# Patient Record
Sex: MALE | Race: ASIAN | Employment: FULL TIME | URBAN - METROPOLITAN AREA
[De-identification: names, ages, dates, MRNs, and addresses within clinical notes are randomized per-mention and may not be internally consistent; named-entity substitution may affect disease eponyms.]

---

## 2024-08-22 ENCOUNTER — OFFICE VISIT (OUTPATIENT)
Age: 44
End: 2024-08-22
Payer: COMMERCIAL

## 2024-08-22 VITALS
HEART RATE: 69 BPM | RESPIRATION RATE: 18 BRPM | WEIGHT: 159.8 LBS | BODY MASS INDEX: 26.62 KG/M2 | OXYGEN SATURATION: 98 % | TEMPERATURE: 98 F | DIASTOLIC BLOOD PRESSURE: 70 MMHG | HEIGHT: 65 IN | SYSTOLIC BLOOD PRESSURE: 126 MMHG

## 2024-08-22 DIAGNOSIS — Z13.6 SCREENING FOR CARDIOVASCULAR CONDITION: ICD-10-CM

## 2024-08-22 DIAGNOSIS — Z11.4 SCREENING FOR HIV (HUMAN IMMUNODEFICIENCY VIRUS): ICD-10-CM

## 2024-08-22 DIAGNOSIS — B35.6 TINEA OF GROIN: ICD-10-CM

## 2024-08-22 DIAGNOSIS — Z11.59 NEED FOR HEPATITIS C SCREENING TEST: ICD-10-CM

## 2024-08-22 DIAGNOSIS — Z11.3 SCREENING FOR STD (SEXUALLY TRANSMITTED DISEASE): ICD-10-CM

## 2024-08-22 DIAGNOSIS — Z12.11 SCREENING FOR COLON CANCER: ICD-10-CM

## 2024-08-22 DIAGNOSIS — N50.811 PAIN IN RIGHT TESTICLE: ICD-10-CM

## 2024-08-22 DIAGNOSIS — Z00.00 ANNUAL PHYSICAL EXAM: Primary | ICD-10-CM

## 2024-08-22 PROCEDURE — 99386 PREV VISIT NEW AGE 40-64: CPT

## 2024-08-22 RX ORDER — CLOTRIMAZOLE AND BETAMETHASONE DIPROPIONATE 10; .64 MG/G; MG/G
CREAM TOPICAL 2 TIMES DAILY
Qty: 45 G | Refills: 0 | Status: SHIPPED | OUTPATIENT
Start: 2024-08-22 | End: 2024-09-05

## 2024-08-22 NOTE — PATIENT INSTRUCTIONS
"Patient Education     Routine physical for adults   The Basics   Written by the doctors and editors at [facility]   What is a physical? -- A physical is a routine visit, or \"check-up,\" with your doctor. You might also hear it called a \"wellness visit\" or \"preventive visit.\"  During each visit, the doctor will:   Ask about your physical and mental health   Ask about your habits, behaviors, and lifestyle   Do an exam   Give you vaccines if needed   Talk to you about any medicines you take   Give advice about your health   Answer your questions  Getting regular check-ups is an important part of taking care of your health. It can help your doctor find and treat any problems you have. But it's also important for preventing health problems.  A routine physical is different from a \"sick visit.\" A sick visit is when you see a doctor because of a health concern or problem. Since physicals are scheduled ahead of time, you can think about what you want to ask the doctor.  How often should I get a physical? -- It depends on your age and health. In general, for people age 21 years and older:   If you are younger than 50 years, you might be able to get a physical every 3 years.   If you are 50 years or older, your doctor might recommend a physical every year.  If you have an ongoing health condition, like diabetes or high blood pressure, your doctor will probably want to see you more often.  What happens during a physical? -- In general, each visit will include:   Physical exam - The doctor or nurse will check your height, weight, heart rate, and blood pressure. They will also look at your eyes and ears. They will ask about how you are feeling and whether you have any symptoms that bother you.   Medicines - It's a good idea to bring a list of all the medicines you take to each doctor visit. Your doctor will talk to you about your medicines and answer any questions. Tell them if you are having any side effects that bother you. You " "should also tell them if you are having trouble paying for any of your medicines.   Habits and behaviors - This includes:   Your diet   Your exercise habits   Whether you smoke, drink alcohol, or use drugs   Whether you are sexually active   Whether you feel safe at home  Your doctor will talk to you about things you can do to improve your health and lower your risk of health problems. They will also offer help and support. For example, if you want to quit smoking, they can give you advice and might prescribe medicines. If you want to improve your diet or get more physical activity, they can help you with this, too.   Lab tests, if needed - The tests you get will depend on your age and situation. For example, your doctor might want to check your:   Cholesterol   Blood sugar   Iron level   Vaccines - The recommended vaccines will depend on your age, health, and what vaccines you already had. Vaccines are very important because they can prevent certain serious or deadly infections.   Discussion of screening - \"Screening\" means checking for diseases or other health problems before they cause symptoms. Your doctor can recommend screening based on your age, risk, and preferences. This might include tests to check for:   Cancer, such as breast, prostate, cervical, ovarian, colorectal, prostate, lung, or skin cancer   Sexually transmitted infections, such as chlamydia and gonorrhea   Mental health conditions like depression and anxiety  Your doctor will talk to you about the different types of screening tests. They can help you decide which screenings to have. They can also explain what the results might mean.   Answering questions - The physical is a good time to ask the doctor or nurse questions about your health. If needed, they can refer you to other doctors or specialists, too.  Adults older than 65 years often need other care, too. As you get older, your doctor will talk to you about:   How to prevent falling at " home   Hearing or vision tests   Memory testing   How to take your medicines safely   Making sure that you have the help and support you need at home  All topics are updated as new evidence becomes available and our peer review process is complete.  This topic retrieved from [employer] on: May 02, 2024.  Topic 963657 Version 1.0  Release: 32.4.3 - C32.122  © 2024 UpToDate, Inc. and/or its affiliates. All rights reserved.  Consumer Information Use and Disclaimer   Disclaimer: This generalized information is a limited summary of diagnosis, treatment, and/or medication information. It is not meant to be comprehensive and should be used as a tool to help the user understand and/or assess potential diagnostic and treatment options. It does NOT include all information about conditions, treatments, medications, side effects, or risks that may apply to a specific patient. It is not intended to be medical advice or a substitute for the medical advice, diagnosis, or treatment of a health care provider based on the health care provider's examination and assessment of a patient's specific and unique circumstances. Patients must speak with a health care provider for complete information about their health, medical questions, and treatment options, including any risks or benefits regarding use of medications. This information does not endorse any treatments or medications as safe, effective, or approved for treating a specific patient. UpToDate, Inc. and its affiliates disclaim any warranty or liability relating to this information or the use thereof.The use of this information is governed by the Terms of Use, available at https://www.[employer].com/en/know/clinical-effectiveness-terms. 2024© UpToDate, Inc. and its affiliates and/or licensors. All rights reserved.  Copyright   © 2024 UpToDate, Inc. and/or its affiliates. All rights reserved.

## 2024-08-22 NOTE — PROGRESS NOTES
Adult Annual Physical  Name: Don Koehler      : 1980      MRN: [id number]  Encounter Provider: Jenifer Barriga DO  Encounter Date: 2024   Encounter department: [facility]    Assessment & Plan   1. Annual physical exam  2. Screening for colon cancer  -     Ambulatory Referral to Gastroenterology; Future  3. Screening for cardiovascular condition  -     Lipid panel; Future  -     Comprehensive metabolic panel; Future  4. Need for hepatitis C screening test  -     Hepatitis C antibody; Future  5. Screening for HIV (human immunodeficiency virus)  -     HIV 1/2 AG/AB w Reflex [facility] for 2 yr old and above; Future  6. BMI 26.0-26.9,adult  7. Tinea of groin  -     clotrimazole-betamethasone (LOTRISONE) 1-0.05 % cream; Apply topically 2 (two) times a day for 14 days  - Return in 2 weeks to assess response   8. Screening for STD (sexually transmitted disease)  -     RPR-Syphilis Screening (Total Syphilis IGG/IGM); Future  -     HSV 1/2 IgG, W/Refl HSV2 Inhibition; Future  -     Chlamydia/GC amplified DNA by PCR; Future  9. Pain in right testicle        - Tenderness upon palpation of mon pubis, no other concerning findings on physical exam        - Reviewed MRI pelvis results with patient from 3/8/24 per request; results show postsurgical changes of left varicocelectomy but no imaging findings to explain right sided orchialgia        - US kidney and bladder US 24 normal         - US scrotum and testicles 24 normal         - Recommended patient to make appointment with urology for f/u on this and further evaluation and management     Immunizations and preventive care screenings were discussed with patient today. Advised patient to bring in copy of other vaccines. Appropriate education was printed on patient's after visit summary.    Counseling:  Alcohol/drug use: discussed moderation in alcohol intake, the recommendations for healthy alcohol use, and avoidance of  illicit drug use.  Dental Health: discussed importance of regular tooth brushing, flossing, and dental visits.  Injury prevention: discussed safety/seat belts, safety helmets, smoke detectors, carbon dioxide detectors, and smoking near bedding or upholstery.  Sexual health: discussed sexually transmitted diseases, partner selection, use of condoms, avoidance of unintended pregnancy, and contraceptive alternatives.  Exercise: the importance of regular exercise/physical activity was discussed. Recommend exercise 3-5 times per week for at least 30 minutes.          History of Present Illness   {Disappearing Hyperlinks I Encounters * My Last Note * Since Last Visit * History :58607}  Adult Annual Physical:  Patient presents for annual physical.     Diet and Physical Activity:  - Diet/Nutrition: well balanced diet and consuming 3-5 servings of fruits/vegetables daily. only drinks water  - Exercise: no formal exercise and 1-2 times a week on average.    Depression Screening:  - PHQ-2 Score: 0    General Health:  - Sleep: 7-8 hours of sleep on average and sleeps well.  - Hearing: normal hearing bilateral ears.  - Vision: no vision problems.  - Dental: regular dental visits.    Patient also reports right testicular pain/ pain on mons pubis for the past 1.5 years ago. States he had a sx (varicocelectomy and circumcision) on 2/16/2023 but had this pain prior the the surgery. Also reports penile itching and itching in inner thighs. Follows with urology for these symptoms.     Review of Systems   Constitutional:  Negative for chills and fever.   HENT:  Negative for ear pain and sore throat.    Eyes:  Negative for pain and visual disturbance.   Respiratory:  Negative for cough and shortness of breath.    Cardiovascular:  Negative for chest pain and palpitations.   Gastrointestinal:  Negative for abdominal pain and vomiting.   Genitourinary:  Positive for testicular pain. Negative for dysuria, hematuria, penile discharge, penile  "swelling and scrotal swelling.        Penile itching, itching in groin region   Musculoskeletal:  Negative for arthralgias and back pain.   Skin:  Negative for color change and rash.   Neurological:  Negative for seizures and syncope.   All other systems reviewed and are negative.    Current Outpatient Medications on File Prior to Visit   Medication Sig Dispense Refill   • [DISCONTINUED] diclofenac potassium (CATAFLAM) 50 mg tablet Take 1 tablet (50 mg total) by mouth 2 (two) times a day for 5 days (Patient not taking: Reported on 8/22/2024) 10 tablet 0   • [DISCONTINUED] hydrOXYzine HCL (ATARAX) 10 mg tablet TAKE 1 TABLET BY MOUTH DAILY AT BEDTIME (Patient not taking: Reported on 2/22/2024) 30 tablet 0   • [DISCONTINUED] methylPREDNISolone 4 MG tablet therapy pack Use as directed on package (Patient not taking: Reported on 8/22/2024) 1 each 0   • [DISCONTINUED] neomycin-bacitracin-polymyxin (NEOSPORIN) 5-400-5,000 ointment Apply topically 2 (two) times a day for 7 days Please apply to your circumcision incision twice daily x 1 week (Patient not taking: Reported on 8/22/2024) 28.3 g 0   • [DISCONTINUED] sildenafil (REVATIO) 20 mg tablet Take 2-5 tablets as needed daily for intercourse (Patient not taking: Reported on 2/22/2024) 60 tablet 6     No current facility-administered medications on file prior to visit.      Social History     Tobacco Use   • Smoking status: Never   • Smokeless tobacco: Never   Vaping Use   • Vaping status: Never Used   Substance and Sexual Activity   • Alcohol use: No   • Drug use: No   • Sexual activity: Not on file       Objective   {Disappearing Hyperlinks   Review Vitals * Enter New Vitals * Results Review * Labs * Imaging * Cardiology * Procedures * Lung Cancer Screening :19023}  /70   Pulse 69   Temp 98 °F (36.7 °C)   Resp 18   Ht 5' 5\" (1.651 m)   Wt 72.5 kg (159 lb 12.8 oz)   SpO2 98%   BMI 26.59 kg/m²     Physical Exam  Vitals reviewed. Exam conducted with a chaperone " present.   Constitutional:       General: He is not in acute distress.     Appearance: Normal appearance. He is well-developed. He is not ill-appearing, toxic-appearing or diaphoretic.   HENT:      Head: Normocephalic and atraumatic.      Right Ear: Tympanic membrane, ear canal and external ear normal. There is no impacted cerumen.      Left Ear: Tympanic membrane, ear canal and external ear normal. There is no impacted cerumen.      Nose: Nose normal. No congestion.      Mouth/Throat:      Mouth: Mucous membranes are moist.      Pharynx: Oropharynx is clear. No oropharyngeal exudate or posterior oropharyngeal erythema.   Eyes:      Extraocular Movements: Extraocular movements intact.      Conjunctiva/sclera: Conjunctivae normal.      Pupils: Pupils are equal, round, and reactive to light.   Cardiovascular:      Rate and Rhythm: Normal rate and regular rhythm.      Pulses: Normal pulses.      Heart sounds: Normal heart sounds. No murmur heard.  Pulmonary:      Effort: Pulmonary effort is normal. No respiratory distress.      Breath sounds: Normal breath sounds.   Abdominal:      General: Abdomen is flat. There is no distension.      Palpations: Abdomen is soft.      Tenderness: There is no abdominal tenderness.   Genitourinary:     Penis: Normal.       Comments: Left testicle greater in size than right testicle, no erythema or swelling noted around testicles or penis, darkened areas of skin in crease of groin   Musculoskeletal:         General: No swelling.      Cervical back: Normal range of motion and neck supple. No rigidity.      Right lower leg: No edema.      Left lower leg: No edema.   Lymphadenopathy:      Cervical: No cervical adenopathy.   Skin:     General: Skin is warm and dry.      Capillary Refill: Capillary refill takes less than 2 seconds.   Neurological:      General: No focal deficit present.      Mental Status: He is alert and oriented to person, place, and time. Mental status is at baseline.    Psychiatric:         Mood and Affect: Mood normal.         Behavior: Behavior normal.

## 2024-09-24 ENCOUNTER — TELEPHONE (OUTPATIENT)
Age: 44
End: 2024-09-24

## 2024-09-24 NOTE — TELEPHONE ENCOUNTER
Pt's son called to schedule colonoscopy. Pt's son is not on consent form. Pt's son was calling pt for verbal consent call was disconnected.

## 2024-09-25 ENCOUNTER — TELEPHONE (OUTPATIENT)
Age: 44
End: 2024-09-25

## 2024-09-25 ENCOUNTER — PREP FOR PROCEDURE (OUTPATIENT)
Age: 44
End: 2024-09-25

## 2024-09-25 DIAGNOSIS — Z12.11 SCREENING FOR COLON CANCER: Primary | ICD-10-CM

## 2024-09-25 NOTE — TELEPHONE ENCOUNTER
Pt's son called again to try and schedule the colonoscopy appt, he does have his father physically in the room with him today to give verbal consent since son is not listed on the medical consent form. Tried to transfer pt's son over to Gastro but disconnected as soon as I was able to reach their office. I let Nakia know in Gastro and she will look into calling them back.

## 2024-09-25 NOTE — LETTER
Tiago Ochoa,     Por favor janet instrucciones para nieto preparación de nieto procedimiento él 10/10/24.  Si tiene alguna pregunta o oleksandr por favor [place] al [phone].            Irma.  [facility] Cirug[facility]                                                                          Instrucciones para la administración de medicamentos   para adultos con diabetes (SIN preparación intestinal)  Medicine Instructions for Adults with Diabetes (NO Bowel Prep)      Siga estas instrucciones cuando nieto procedimiento médico o cirugía NO requiera PREPARACIÓN INTESTINAL.    NOTA:  Agonistas del receptor del GLP-1 que se angelika de forma semanal: no los tome por 7 días antes del procedimiento médico.  Inhibidores del SGLT-2: no los tome por 4 días antes del procedimiento médico.    El día previo a la cirugía o al procedimiento médico  Si tiene programado un procedimiento (p. ej., ludwig colonoscopía) o ludwig cirugía que NO requiere preparación intestinal, siga las instrucciones a continuación de acuerdo al tipo de medicamento que cristal para nieto diabetes.    Tipo de medicamento que cristal Ejemplos Qué hacer   Insulina premezclada de acción intermedia Humalog® 75/25, Humulin® 70/30, Novolog® 70/30, insulina normal Leary la mitad de nieto dosis normal la noche previa al procedimiento médico.   Insulina de acción rápida/insulina de acción prolongada Humalog® U200, NovoLog®, Apidra®, Lantus®, Levemir®, Tresiba®, Toujeo®, Fiasp®, Basaglar® Leary nieto dosis normal COMPLETA el día previo al procedimiento médico.   Medicamentos orales para la diabetes (sulfonilurea) Glipizida/Glimepirida/Glucotrol® Leary nieto dosis normal con la gayla la noche previa al procedimiento médico.   Otros medicamentos orales para la diabetes   Metformin®, Glucophage®, Glucophage XR®, Riomet®, Glumetza®, Actose®, Avandia®, Glyset®, Prandin® Leary nieto dosis normal con la gayla la noche previa al procedimiento médico.   Agonistas del GLP-1 Adlyxin®, Byetta®,  Bydureon®, Ozempic®, Soliqua®, Tanzeum®, Trulicity®, Victoza®, Saxenda®, Rybelsus®, Wegovy® Si los cristal a diario, tómelos sugey normalmente.    Si los cristal de forma semanal, no tome estos medicamentos gini 7 días antes del procedimiento, incluyendo les mismo día (reanude la cristal después del procedimiento).   Inhibidores del SGLT-2 Jardiance®, Invokana®, Farxiga®,   Steglatro®, Brenzavvy®, Qtern®, Segluromet®, Glyxambi®, Synjardy®, Synjardy XR®, Invokamet®, Invokamet XR®, Trijary XR®, Xigduo XR®, Steglujan® No los tome gini 4 días antes del procedimiento médico, incluyendo les mismo día (reanude la cristal después del procedimiento).   En la parte de atrás, puede encontrar más información sobre “El día de la cirugía o el procedimiento médico”                  Instrucciones para la administración de medicamentos  para adultos con diabetes (sin preparación intestinal)     Página 2      Día de la cirugía o del procedimiento médico  Siga las indicaciones a continuación de acuerdo con el tipo de medicamento que cristal para tratar nieto diabetes.    Tipo de medicamento que cristal Ejemplos Qué hacer   Insulina de acción prolongada Lantus®, Levemir®, Tresiba®, Toujeo®, Basaglar®, Semglee® Si normalmente cristal nieto insulina de acción prolongada a la mañana, tome la dosis completa sugey está programada.   Agonistas del GLP-1 AdlyxinÒ, ByettaÒ, BydureonÒ, OzempicÒ, SoliquaÒ, TanzeumÒ, TrulicityÒ, VictozaÒ, Saxenda®, Rybelsus® NO tome fran medicamento el día del procedimiento médico (reanude la cristal después).     A excepción de la cristal matutina de la insulina de acción prolongada, NO tome NINGÚN medicamento para la diabetes el día del procedimiento médico, a menos que lo indique el médico que lleva el control de vijay medicamentos para la diabetes.   Continúe controlando vijay niveles de azúcar en padmini.  Si tiene ludwig bomba de insulina, hable con nieto endocrinólogo al menos 3 días antes del procedimiento médico y solicítele instrucciones.  NOTA: Si no puede hablar con nieto endocrinólogo con antelación, el día del procedimiento ajuste nieto bomba de insulina solo a nieto ritmo basal. Traiga los suministros de la bomba de insulina al hospital.     Si tiene alguna oleksandr sobre la dosificación de los medicamentos para la diabetes antes del procedimiento médico, póngase en contacto con el médico que administra vijay medicamentos para la diabetes.    Colonoscopía -  Dulcolax/Miralax       Nuestra oficina requiere un aviso de 1 semana antes para cualquier cancelación o hacer nueva eliud.  Nosotros amablemente le pedimos que nos comunique inmediatamente de cualquier cambio.  Irma por nieto cooperación.    COMPRAR: Todos los productos pueden ser comprados en  cualquier farmacia sin receta médica  Dos tabletas de Dulcolax (bisacodyl) de 5 mg   Miralax en polvo 238 gramos  Dos botellas de Gatorade de 32 onzas - Nada lester, anaranjado o elina  SEMANA ANTES DEL PROCEDIMIENTO:  No tome las tabletas de dandy.  5 días antes de nieto eliud EVITE vegetales y frutas con cáscara o semillas, nueces, maíz, palomitas de maíz, y pan de granos enteros.     D?A ANTES DEL PROCEDIMIENTO:   Solamente líquidos YAN gini  todo el día anterior. Annona lester, anaranjado o elina  Usted puede beber:  Soda  [place]  Caldo Gatorade  Gelatina  Paletas de hielo Café/té sin leche/crema   EVITE:  Comidas sólidas   Leche y productos lácteos    Jugo con la pulpa de la fruta    PREPARACION (Por la tarde antes del procedimiento):  ?? A las 5pm: [place] dos tabletas de 5 mg del laxante Dulcolax.  ?? A las 6pm: Diluir el envase completo de Miralax con 64 onzas de Gatorade.  Tómese  8 onzas cada 15                    minutos hasta que termine las primeras 32 onzas.    ?? Tómese  las restantes 32onzas de la preparación de  Miralax:  ?Empieze a las 3am para completar a las 4am el día del procedimiento. (AM procedimiento)  ?Empieze a las 7am para completar las 8am el día del procedimiento (PM procedimiento)  Asegúrese  de completar por menos 3 horas antes de salir de nieto casa.  HONG DEL PROCEDIMIENTO:  Debe cepillarse los dientes.  Deje todas vijay joyas (Prendas) en la casa.  Por favor llegue a nieto procedimiento sugey se lo indicó el  OR / GI Lab / Endoscopy Unit  Asegúrese de que usted marshall hecho arreglos con anticipación para que un adulto responsable (18 años o más) lo lleve de regreso a nieto casa después del procedimiento.  No se permite regresar a nieto casa en taxi o transporte público.  Los efectos de la anestesia pueden  perdurar hasta por 24 horas.  Después de recibir la sedación, tiene que ser cauteloso antes de que se comprometa en cualquier actividad que pueda dañarlo a usted o a otros (tales sugey manejar un maris). No francia ninguna decisión importante  o no tome bebidas alcohólicas gini fran período de tiempo.  Después del procedimiento, usted puede comer o beber cualquier cosa que le guste.  Probablemente quiera comenzar con algo ligero.  Por favor incluya muchos líquidos.  Evite alimentos que le causen gases sugey sodas o ensaladas.    INSTRUCCIONES ESPECIALES:  Adelgazador de Maurice (i.e. - Coumadin, Pradaxa, Lovenox, Xarelto, Eliquis)  ?  Continúe (No pare)  ? Pare____________________________por_____________días antes del procedimiento.  ?  Por favor discútalo con nieto Médico Primario o Cardiólogo y envíe las instrucciones a nuestra oficina    Antiplaqueta (i.e. - Plavix, Aggrenox, Effient, Brilinta)  ?  Continúe (No pare)  ? Pare________________________________por_____________días antes del procedimiento.  ?  Por favor discútalo con el Médico que le receta el medicamento y envíe instrucciones a nuestra oficina     Diabetes:   Si usted es diabético por favor abdullahi la hoja separada de instrucciones para diabéticos          Medicamentos recetados:  No pare de wicho nieto aspirina, o cualquier otro de vijay medicamentos.  [place] el mono de vijay medicamentos recetados con sorbos de agua por lo menos dos horas antes de nieto eliud.         [place]  DE COMER O BEBER INCLUYENDO GOMA DE MASCAR (Excepto la preparación si se le indica) DESPUÉS DE MEDIANOCHE ANTES DEL PROCEDIMIENTO.

## 2024-09-25 NOTE — TELEPHONE ENCOUNTER
09/25/24  Screened by: Gregoria Jacob MA    Referring Provider Dr. Barriga    Pre- Screening:     There is no height or weight on file to calculate BMI.26.59  Has patient been referred for a routine screening Colonoscopy? yes  Is the patient between 45-75 years old? yes      Previous Colonoscopy no   If yes:    Date:     Facility:     Reason:         Does the patient want to see a Gastroenterologist prior to their procedure OR are they having any GI symptoms? no    Has the patient been hospitalized or had abdominal surgery in the past 6 months? no    Does the patient use supplemental oxygen? no    Does the patient take Coumadin, Lovenox, Plavix, Elliquis, Xarelto, or other blood thinning medication? no    Has the patient had a stroke, cardiac event, or stent placed in the past year? no      If patient is between 45yrs - 49yrs, please advise patient that we will have to confirm benefits & coverage with their insurance company for a routine screening colonoscopy.

## 2024-09-25 NOTE — TELEPHONE ENCOUNTER
Scheduled date of colonoscopy (as of today): 10/10/24    Physician performing colonoscopy: Dr. Babcock    Location of colonoscopy: [place]

## 2024-09-26 ENCOUNTER — TELEPHONE (OUTPATIENT)
Dept: PREADMISSION TESTING | Facility: HOSPITAL | Age: 44
End: 2024-09-26

## 2024-10-07 ENCOUNTER — TELEPHONE (OUTPATIENT)
Dept: OTHER | Facility: OTHER | Age: 44
End: 2024-10-07

## 2024-10-07 NOTE — TELEPHONE ENCOUNTER
Per patient's phone call, he received a previous message requesting him to call back to confirm his appt on 10/10. Per patient, he called back and confirmed the appt. Patient stated that he received another voicemail today requesting for him to call back and confirm the appt. Patient is getting worried that his appt might get cancelled.

## 2024-10-09 RX ORDER — SODIUM CHLORIDE, SODIUM LACTATE, POTASSIUM CHLORIDE, CALCIUM CHLORIDE 600; 310; 30; 20 MG/100ML; MG/100ML; MG/100ML; MG/100ML
50 INJECTION, SOLUTION INTRAVENOUS CONTINUOUS
Status: CANCELLED | OUTPATIENT
Start: 2024-10-09

## 2024-10-10 ENCOUNTER — ANESTHESIA (OUTPATIENT)
Dept: GASTROENTEROLOGY | Facility: HOSPITAL | Age: 44
End: 2024-10-10
Payer: COMMERCIAL

## 2024-10-10 ENCOUNTER — HOSPITAL ENCOUNTER (OUTPATIENT)
Dept: GASTROENTEROLOGY | Facility: HOSPITAL | Age: 44
Setting detail: OUTPATIENT SURGERY
End: 2024-10-10
Attending: INTERNAL MEDICINE
Payer: COMMERCIAL

## 2024-10-10 ENCOUNTER — ANESTHESIA EVENT (OUTPATIENT)
Dept: ANESTHESIOLOGY | Facility: HOSPITAL | Age: 44
End: 2024-10-10

## 2024-10-10 ENCOUNTER — ANESTHESIA EVENT (OUTPATIENT)
Dept: GASTROENTEROLOGY | Facility: HOSPITAL | Age: 44
End: 2024-10-10
Payer: COMMERCIAL

## 2024-10-10 ENCOUNTER — ANESTHESIA (OUTPATIENT)
Dept: ANESTHESIOLOGY | Facility: HOSPITAL | Age: 44
End: 2024-10-10

## 2024-10-10 VITALS
HEIGHT: 65 IN | DIASTOLIC BLOOD PRESSURE: 66 MMHG | WEIGHT: 153.4 LBS | TEMPERATURE: 98.4 F | HEART RATE: 67 BPM | SYSTOLIC BLOOD PRESSURE: 106 MMHG | RESPIRATION RATE: 16 BRPM | OXYGEN SATURATION: 98 % | BODY MASS INDEX: 25.56 KG/M2

## 2024-10-10 DIAGNOSIS — Z12.11 SCREENING FOR COLON CANCER: ICD-10-CM

## 2024-10-10 PROCEDURE — 88305 TISSUE EXAM BY PATHOLOGIST: CPT | Performed by: PATHOLOGY

## 2024-10-10 PROCEDURE — 45385 COLONOSCOPY W/LESION REMOVAL: CPT | Performed by: INTERNAL MEDICINE

## 2024-10-10 RX ORDER — PROPOFOL 10 MG/ML
INJECTION, EMULSION INTRAVENOUS AS NEEDED
Status: DISCONTINUED | OUTPATIENT
Start: 2024-10-10 | End: 2024-10-10

## 2024-10-10 RX ORDER — LIDOCAINE HYDROCHLORIDE 10 MG/ML
INJECTION, SOLUTION EPIDURAL; INFILTRATION; INTRACAUDAL; PERINEURAL AS NEEDED
Status: DISCONTINUED | OUTPATIENT
Start: 2024-10-10 | End: 2024-10-10

## 2024-10-10 RX ORDER — PROPOFOL 10 MG/ML
INJECTION, EMULSION INTRAVENOUS CONTINUOUS PRN
Status: DISCONTINUED | OUTPATIENT
Start: 2024-10-10 | End: 2024-10-10

## 2024-10-10 RX ORDER — SODIUM CHLORIDE, SODIUM LACTATE, POTASSIUM CHLORIDE, CALCIUM CHLORIDE 600; 310; 30; 20 MG/100ML; MG/100ML; MG/100ML; MG/100ML
INJECTION, SOLUTION INTRAVENOUS CONTINUOUS PRN
Status: DISCONTINUED | OUTPATIENT
Start: 2024-10-10 | End: 2024-10-10

## 2024-10-10 RX ADMIN — PROPOFOL 100 MG: 10 INJECTION, EMULSION INTRAVENOUS at 13:31

## 2024-10-10 RX ADMIN — SODIUM CHLORIDE, SODIUM LACTATE, POTASSIUM CHLORIDE, AND CALCIUM CHLORIDE: .6; .31; .03; .02 INJECTION, SOLUTION INTRAVENOUS at 13:27

## 2024-10-10 RX ADMIN — PROPOFOL 100 MCG/KG/MIN: 10 INJECTION, EMULSION INTRAVENOUS at 13:31

## 2024-10-10 RX ADMIN — LIDOCAINE HYDROCHLORIDE 50 MG: 10 INJECTION, SOLUTION EPIDURAL; INFILTRATION; INTRACAUDAL at 13:31

## 2024-10-10 NOTE — ANESTHESIA PREPROCEDURE EVALUATION
"Procedure:  PRE-OP ONLY    Relevant Problems   CARDIO   (+) Mixed hyperlipidemia   (+) Short of breath on exertion      MUSCULOSKELETAL   (+) Chronic bilateral low back pain without sciatica      NEURO/PSYCH   (+) Chronic bilateral low back pain without sciatica   (+) Chronic male pelvic pain      PULMONARY   (+) Short of breath on exertion      Ear/Nose/Throat   (+) BPPV (benign paroxysmal positional vertigo)             Anesthesia Plan  ASA Score- 2     Anesthesia Type- IV sedation with anesthesia with ASA Monitors.         Additional Monitors:     Airway Plan:            Plan Factors-    Chart reviewed.   Existing labs reviewed. Patient summary reviewed.                  Induction-     Postoperative Plan-     Perioperative Resuscitation Plan - Level 1 - Full Code.       Informed Consent- Anesthetic plan and risks discussed with patient.  I personally reviewed this patient with the CRNA. Discussed and agreed on the Anesthesia Plan with the CRNA..        No results found for: \"HGBA1C\"    Lab Results   Component Value Date    K 3.3 (L) 06/12/2023     06/12/2023    CO2 31 06/12/2023    BUN 14 06/12/2023    CREATININE 0.90 06/12/2023    GLUF 96 08/12/2019    CALCIUM 9.4 06/12/2023    AST 41 08/12/2019    ALT 42 08/12/2019    ALKPHOS 104 08/12/2019    EGFR 104 06/12/2023       Lab Results   Component Value Date    WBC 7.50 06/12/2023    HGB 13.9 06/12/2023    HCT 40.3 06/12/2023    MCV 88 06/12/2023     06/12/2023     Normal sinus rhythm  Normal ECG  When compared with ECG of 15-APR-2022 16:57,  No significant change was found  Confirmed by Johan Mancia ([id number]) on 7/14/2022 8:19:23 AM      Specimen Collected: 07/13/22 18:13        "

## 2024-10-10 NOTE — ANESTHESIA POSTPROCEDURE EVALUATION
Post-Op Assessment Note    CV Status:  Stable    Pain management: adequate       Mental Status:  Alert and awake   Hydration Status:  Euvolemic   PONV Controlled:  Controlled   Airway Patency:  Patent     Post Op Vitals Reviewed: Yes    No anethesia notable event occurred.    Staff: Anesthesiologist           Last Filed PACU Vitals:  Vitals Value Taken Time   Temp 98.4 °F (36.9 °C) 10/10/24 1350   Pulse 67 10/10/24 1435   /66 10/10/24 1435   Resp 16 10/10/24 1435   SpO2 98 % 10/10/24 1435       Modified Nataliia:  Activity: 2 (10/10/2024  2:35 PM)  Respiration: 2 (10/10/2024  2:35 PM)  Circulation: 2 (10/10/2024  2:35 PM)  Consciousness: 2 (10/10/2024  2:35 PM)  Oxygen Saturation: 2 (10/10/2024  2:35 PM)  Modified Nataliia Score: 10 (10/10/2024  2:35 PM)

## 2024-10-10 NOTE — ANESTHESIA PREPROCEDURE EVALUATION
"Procedure:  COLONOSCOPY    Relevant Problems   CARDIO   (+) Mixed hyperlipidemia   (+) Short of breath on exertion      MUSCULOSKELETAL   (+) Chronic bilateral low back pain without sciatica      NEURO/PSYCH   (+) Chronic bilateral low back pain without sciatica   (+) Chronic male pelvic pain      PULMONARY   (+) Short of breath on exertion        Physical Exam    Airway    Mallampati score: II  TM Distance: >3 FB  Neck ROM: full     Dental   No notable dental hx     Cardiovascular  Cardiovascular exam normal    Pulmonary  Pulmonary exam normal     Other Findings        Anesthesia Plan  ASA Score- 2     Anesthesia Type- IV sedation with anesthesia with ASA Monitors.         Additional Monitors:     Airway Plan:            Plan Factors-Exercise tolerance (METS): >4 METS.    Chart reviewed.   Existing labs reviewed. Patient summary reviewed.    Patient is not a current smoker. Patient not instructed to abstain from smoking on day of procedure. Patient did not smoke on day of surgery.            Induction-     Postoperative Plan-     Perioperative Resuscitation Plan - Level 1 - Full Code.       Informed Consent- Anesthetic plan and risks discussed with patient.  I personally reviewed this patient with the CRNA. Discussed and agreed on the Anesthesia Plan with the CRNA..        No results found for: \"HGBA1C\"    Lab Results   Component Value Date    K 3.3 (L) 06/12/2023     06/12/2023    CO2 31 06/12/2023    BUN 14 06/12/2023    CREATININE 0.90 06/12/2023    GLUF 96 08/12/2019    CALCIUM 9.4 06/12/2023    AST 41 08/12/2019    ALT 42 08/12/2019    ALKPHOS 104 08/12/2019    EGFR 104 06/12/2023       Lab Results   Component Value Date    WBC 7.50 06/12/2023    HGB 13.9 06/12/2023    HCT 40.3 06/12/2023    MCV 88 06/12/2023     06/12/2023     Normal sinus rhythm  Normal ECG  When compared with ECG of 15-APR-2022 16:57,  No significant change was found  Confirmed by Johan Mancia ([id number]) on 7/14/2022 8:19:23 AM "      Specimen Collected: 07/13/22 18:13

## 2024-10-10 NOTE — ANESTHESIA POSTPROCEDURE EVALUATION
Post-Op Assessment Note    CV Status:  Stable  Pain Score: 0    Pain management: adequate       Mental Status:  Arousable   Hydration Status:  Euvolemic   PONV Controlled:  Controlled   Airway Patency:  Patent  Two or more mitigation strategies used for obstructive sleep apnea   Post Op Vitals Reviewed: Yes    No anethesia notable event occurred.    Staff: CRNA           Last Filed PACU Vitals:  Vitals Value Taken Time   Temp 98.4 °F (36.9 °C) 10/10/24 1350   Pulse 74 10/10/24 1350   /52 10/10/24 1350   Resp 14 10/10/24 1350   SpO2 96 % 10/10/24 1350       Modified Nataliia:  Activity: 0 (10/10/2024  1:50 PM)  Respiration: 1 (10/10/2024  1:50 PM)  Circulation: 2 (10/10/2024  1:50 PM)  Consciousness: 0 (10/10/2024  1:50 PM)  Oxygen Saturation: 2 (10/10/2024  1:50 PM)  Modified Nataliia Score: 5 (10/10/2024  1:50 PM)

## 2024-10-10 NOTE — H&P
"History and Physical - [facility]  Don Koehler 44 y.o. male MRN: [id number]                  HPI: Don Koehler is a 44 y.o. year old male who presents for colon cancer screening      REVIEW OF SYSTEMS: Per the HPI, and otherwise unremarkable.    Historical Information   Past Medical History:   Diagnosis Date    Back pain     SOB (shortness of breath)      Past Surgical History:   Procedure Laterality Date    VT CIRCUMCISION AGE >28 DAYS N/A 2/16/2023    Procedure: CIRCUMCISION;  Surgeon: Gil Luke MD;  [facility];  Service: Urology    VT EXC VARICOCELE/LIGATION SPERMATIC VEINS SPX Left 2/16/2023    Procedure: VARICOCELECTOMY;  Surgeon: Gil Luke MD;  [facility] MA[facility];  Service: Urology     Social History   Social History     Substance and Sexual Activity   Alcohol Use No     Social History     Substance and Sexual Activity   Drug Use No     Social History     Tobacco Use   Smoking Status Never   Smokeless Tobacco Never     Family History   Problem Relation Age of Onset    No Known Problems Mother     No Known Problems Father        Meds/Allergies       Current Outpatient Medications:     clotrimazole-betamethasone (LOTRISONE) 1-0.05 % cream    No Known Allergies    Objective     /58   Pulse 76   Temp 98.7 °F (37.1 °C) (Tympanic)   Resp 14   Ht 5' 5\" (1.651 m)   Wt 69.6 kg (153 lb 6.4 oz)   SpO2 100%   BMI 25.53 kg/m²       PHYSICAL EXAM    Gen: NAD  Head: NCAT  CV: RRR  CHEST: Clear  ABD: soft, NT/ND  EXT: no edema      ASSESSMENT/PLAN:  This is a 44 y.o. year old male here for colonoscopy, and he is stable and optimized for his procedure.        "

## 2024-10-11 ENCOUNTER — OFFICE VISIT (OUTPATIENT)
Age: 44
End: 2024-10-11
Payer: COMMERCIAL

## 2024-10-11 DIAGNOSIS — B35.6 TINEA OF GROIN: Primary | ICD-10-CM

## 2024-10-11 DIAGNOSIS — F52.4 PREMATURE EJACULATION: ICD-10-CM

## 2024-10-11 DIAGNOSIS — N50.811 PAIN IN RIGHT TESTICLE: ICD-10-CM

## 2024-10-11 PROCEDURE — 99214 OFFICE O/P EST MOD 30 MIN: CPT

## 2024-10-11 RX ORDER — KETOCONAZOLE 20 MG/G
CREAM TOPICAL DAILY
Qty: 30 G | Refills: 0 | Status: CANCELLED | OUTPATIENT
Start: 2024-10-11

## 2024-10-11 RX ORDER — NYSTATIN 100000 [USP'U]/G
POWDER TOPICAL 4 TIMES DAILY
Qty: 60 G | Refills: 0 | Status: SHIPPED | OUTPATIENT
Start: 2024-10-11
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
## 2024-10-15 PROCEDURE — 88305 TISSUE EXAM BY PATHOLOGIST: CPT | Performed by: PATHOLOGY

## 2024-10-16 NOTE — RESULT ENCOUNTER NOTE
Please call the patient regarding results.  Colon polyp was benign adenoma.  Repeat colonoscopy in 3 years

## 2024-10-23 ENCOUNTER — OFFICE VISIT (OUTPATIENT)
Dept: DENTISTRY | Facility: CLINIC | Age: 44
End: 2024-10-23

## 2024-10-23 VITALS — DIASTOLIC BLOOD PRESSURE: 74 MMHG | HEART RATE: 63 BPM | SYSTOLIC BLOOD PRESSURE: 107 MMHG

## 2024-10-23 DIAGNOSIS — K02.62 DENTAL CARIES EXTENDING INTO DENTIN: Primary | ICD-10-CM

## 2024-10-23 PROCEDURE — D2392 RESIN-BASED COMPOSITE - 2 SURFACES, POSTERIOR: HCPCS | Performed by: DENTIST

## 2024-10-23 PROCEDURE — D2393 RESIN-BASED COMPOSITE - 3 SURFACES, POSTERIOR: HCPCS | Performed by: DENTIST

## 2024-10-23 NOTE — DENTAL PROCEDURE DETAILS
Patient presents for a dental restoration and verbally consents for treatment:  Reviewed health history-  Pt is ASA type   Treatment consents signed: Yes  Perio: plaque  Pain Scale:0  Caries Assessment: moderate  Radiographs: Films are current  Oral Hygiene instruction reviewed and given  Hygiene recall visits recommended to the patient  Oral cancer screening done and no pathology noted on ROM, FOM , Palate,soft tissue or tongue.    Patient agrees with the diagnosis of Caries and the proposed treatment plan for the resin restoration:  Tooth #2-OL           #3-MOL  Discussed with patient need for RCT if pulp exposure occurs or in the future if pulp is inflamed. Pt understands and consents.    Dental Anesthesia: 1 Carpule 2% Lidocaine 1:100K epi infiltrations.  Excavated caries with high speed and round bur on slow speed.  Material:  Selective etch and rinsed. Ivoclar edouard placed and EvoCeram resin placed and cured.  Shade: A2  Polished with finishing burs and Enhance. Occlusion adjusted and contact good and adequate.   Gave post op instructions to avoid chewing till numbness goes away.    All questions answered. Pt left satisfied and in good health.    Prognosis is Good.   Referrals Needed: No      Next visit: dillon Villar

## 2024-11-08 ENCOUNTER — APPOINTMENT (OUTPATIENT)
Dept: LAB | Facility: CLINIC | Age: 44
End: 2024-11-08
Payer: COMMERCIAL

## 2024-11-08 ENCOUNTER — OFFICE VISIT (OUTPATIENT)
Age: 44
End: 2024-11-08
Payer: COMMERCIAL

## 2024-11-08 VITALS
WEIGHT: 159 LBS | DIASTOLIC BLOOD PRESSURE: 70 MMHG | BODY MASS INDEX: 26.49 KG/M2 | SYSTOLIC BLOOD PRESSURE: 90 MMHG | HEART RATE: 72 BPM | HEIGHT: 65 IN | OXYGEN SATURATION: 95 %

## 2024-11-08 DIAGNOSIS — Z11.3 SCREENING FOR STD (SEXUALLY TRANSMITTED DISEASE): ICD-10-CM

## 2024-11-08 DIAGNOSIS — Z13.6 SCREENING FOR CARDIOVASCULAR CONDITION: ICD-10-CM

## 2024-11-08 DIAGNOSIS — R10.2 MALE PELVIC PAIN: ICD-10-CM

## 2024-11-08 DIAGNOSIS — Z11.59 NEED FOR HEPATITIS C SCREENING TEST: ICD-10-CM

## 2024-11-08 DIAGNOSIS — N50.811 PAIN IN RIGHT TESTICLE: ICD-10-CM

## 2024-11-08 DIAGNOSIS — N52.8 OTHER MALE ERECTILE DYSFUNCTION: Primary | ICD-10-CM

## 2024-11-08 DIAGNOSIS — Z11.4 SCREENING FOR HIV (HUMAN IMMUNODEFICIENCY VIRUS): ICD-10-CM

## 2024-11-08 DIAGNOSIS — N52.8 OTHER MALE ERECTILE DYSFUNCTION: ICD-10-CM

## 2024-11-08 DIAGNOSIS — F52.4 PREMATURE EJACULATION: ICD-10-CM

## 2024-11-08 LAB
ALBUMIN SERPL BCG-MCNC: 4.2 G/DL (ref 3.5–5)
ALP SERPL-CCNC: 98 U/L (ref 34–104)
ALT SERPL W P-5'-P-CCNC: 34 U/L (ref 7–52)
ANION GAP SERPL CALCULATED.3IONS-SCNC: 7 MMOL/L (ref 4–13)
AST SERPL W P-5'-P-CCNC: 23 U/L (ref 13–39)
BILIRUB SERPL-MCNC: 0.4 MG/DL (ref 0.2–1)
BUN SERPL-MCNC: 16 MG/DL (ref 5–25)
CALCIUM SERPL-MCNC: 8.9 MG/DL (ref 8.4–10.2)
CHLORIDE SERPL-SCNC: 104 MMOL/L (ref 96–108)
CHOLEST SERPL-MCNC: 231 MG/DL
CO2 SERPL-SCNC: 28 MMOL/L (ref 21–32)
CREAT SERPL-MCNC: 0.87 MG/DL (ref 0.6–1.3)
FSH SERPL-ACNC: 7.1 MIU/ML
GFR SERPL CREATININE-BSD FRML MDRD: 104 ML/MIN/1.73SQ M
GLUCOSE P FAST SERPL-MCNC: 94 MG/DL (ref 65–99)
HCV AB SER QL: NORMAL
HDLC SERPL-MCNC: 50 MG/DL
HIV 1+2 AB+HIV1 P24 AG SERPL QL IA: NORMAL
HIV 2 AB SERPL QL IA: NORMAL
HIV1 AB SERPL QL IA: NORMAL
HIV1 P24 AG SERPL QL IA: NORMAL
LDLC SERPL CALC-MCNC: 145 MG/DL (ref 0–100)
LH SERPL-ACNC: 5.5 MIU/ML
NONHDLC SERPL-MCNC: 181 MG/DL
POTASSIUM SERPL-SCNC: 4 MMOL/L (ref 3.5–5.3)
PROLACTIN SERPL-MCNC: 9.26 NG/ML
PROT SERPL-MCNC: 6.8 G/DL (ref 6.4–8.4)
SODIUM SERPL-SCNC: 139 MMOL/L (ref 135–147)
TREPONEMA PALLIDUM IGG+IGM AB [PRESENCE] IN SERUM OR PLASMA BY IMMUNOASSAY: NORMAL
TRIGL SERPL-MCNC: 178 MG/DL

## 2024-11-08 PROCEDURE — 80053 COMPREHEN METABOLIC PANEL: CPT

## 2024-11-08 PROCEDURE — 83001 ASSAY OF GONADOTROPIN (FSH): CPT

## 2024-11-08 PROCEDURE — 84402 ASSAY OF FREE TESTOSTERONE: CPT

## 2024-11-08 PROCEDURE — 86780 TREPONEMA PALLIDUM: CPT

## 2024-11-08 PROCEDURE — 87389 HIV-1 AG W/HIV-1&-2 AB AG IA: CPT

## 2024-11-08 PROCEDURE — 99213 OFFICE O/P EST LOW 20 MIN: CPT | Performed by: PHYSICIAN ASSISTANT

## 2024-11-08 PROCEDURE — 80061 LIPID PANEL: CPT

## 2024-11-08 PROCEDURE — 84403 ASSAY OF TOTAL TESTOSTERONE: CPT

## 2024-11-08 PROCEDURE — 87591 N.GONORRHOEAE DNA AMP PROB: CPT

## 2024-11-08 PROCEDURE — 87491 CHLMYD TRACH DNA AMP PROBE: CPT

## 2024-11-08 PROCEDURE — 36415 COLL VENOUS BLD VENIPUNCTURE: CPT

## 2024-11-08 PROCEDURE — 86803 HEPATITIS C AB TEST: CPT

## 2024-11-08 PROCEDURE — 84146 ASSAY OF PROLACTIN: CPT

## 2024-11-08 PROCEDURE — 83002 ASSAY OF GONADOTROPIN (LH): CPT

## 2024-11-08 RX ORDER — METHYLPREDNISOLONE 4 MG/1
TABLET ORAL
Qty: 21 EACH | Refills: 0 | Status: SHIPPED | OUTPATIENT
Start: 2024-11-08

## 2024-11-08 RX ORDER — TADALAFIL 5 MG/1
5 TABLET ORAL DAILY
Qty: 30 TABLET | Refills: 6 | Status: SHIPPED | OUTPATIENT
Start: 2024-11-08

## 2024-11-08 NOTE — PROGRESS NOTES
UROLOGY PROGRESS NOTE   Patient Identifiers: Don Koehler (MRN [id number])  Date of Service: 11/8/2024  Patient is seen with the assistance of an online   Subjective:   44-year-old male we have been following for several years with orchialgia and suprapubic pain.  I saw him in February and ordered an MRI which was unremarkable for osteitis.  He is also had several ultrasounds which were also normal.  He had some type of inguinal surgery in the past which may have been a varicocele ectomy.  I ordered a Medrol Dosepak for him in February which he did not get.  He also complains of erectile dysfunction.  Testosterone done a few years ago was about 360.    Reason for visit: Orchialgia follow-up    Objective:     VITALS:    Vitals:    11/08/24 0833   BP: 90/70   Pulse: 72   SpO2: 95%     AUA SYMPTOM SCORE      Flowsheet Row Most Recent Value   AUA SYMPTOM SCORE    How often have you had a sensation of not emptying your bladder completely after you finished urinating? 1 (P)     How often have you had to urinate again less than two hours after you finished urinating? 1 (P)     How often have you found you stopped and started again several times when you urinate? 1 (P)     How often have you found it difficult to postpone urination? 4 (P)     How often have you had a weak urinary stream? 4 (P)     How often have you had to push or strain to begin urination? 4 (P)     How many times did you most typically get up to urinate from the time you went to bed at night until the time you got up in the morning? 1 (P)     Quality of Life: If you were to spend the rest of your life with your urinary condition just the way it is now, how would you feel about that? 5 (P)     AUA SYMPTOM SCORE 16 (P)                LABS:  Lab Results   Component Value Date    HGB 13.9 06/12/2023    HCT 40.3 06/12/2023    WBC 7.50 06/12/2023     06/12/2023   ]    Lab Results   Component Value Date    K 3.3 (L)  "06/12/2023     06/12/2023    CO2 31 06/12/2023    BUN 14 06/12/2023    CREATININE 0.90 06/12/2023    CALCIUM 9.4 06/12/2023   ]        INPATIENT MEDS:    Current Outpatient Medications:     nystatin (MYCOSTATIN) powder, Apply topically 4 (four) times a day, Disp: 60 g, Rfl: 0    clotrimazole-betamethasone (LOTRISONE) 1-0.05 % cream, Apply topically 2 (two) times a day for 14 days, Disp: 45 g, Rfl: 0      Physical Exam:   BP 90/70 (BP Location: Left arm, Patient Position: Sitting, Cuff Size: Standard)   Pulse 72   Ht 5' 5\" (1.651 m)   Wt 72.1 kg (159 lb)   SpO2 95%   BMI 26.46 kg/m²   GEN: no acute distress    RESP: breathing comfortably with no accessory muscle use    ABD: soft, non-tender, non-distended   INCISION:    EXT: no significant peripheral edema   (Male): Penis circumcised, phallus normal, meatus patent.  Testicles descended into scrotum bilaterally without masses nor tenderness.  No inguinal hernias bilaterally.      RADIOLOGY:   MPRESSION:     Postsurgical changes of left varicocelectomy. No imaging findings to explain right-sided orchialgia.     Assessment:   #1.  Male pelvic pain  #2.  Erectile dysfunction    Plan:   -I ordered the Medrol Dosepak as well as 5 mg Cialis  -Follow-up in 6 months  -  -          "

## 2024-11-09 LAB
TESTOST FREE SERPL-MCNC: 14.4 PG/ML (ref 6.8–21.5)
TESTOST SERPL-MCNC: 467 NG/DL (ref 264–916)

## 2024-11-10 LAB
C TRACH DNA SPEC QL NAA+PROBE: NEGATIVE
N GONORRHOEA DNA SPEC QL NAA+PROBE: NEGATIVE

## 2024-11-11 NOTE — RESULT ENCOUNTER NOTE
Attempted to call patient with -did not answer. Called other phone number listed and go a hold of his son. Patient at office visit gave me permission to speak to his son. Discussed lab results with son who will communicate this to his father. Lipid panel shows elevated LDL and triglycerides. Reccommended to decrease cholesterol containing foods and overall improve nutrition as well as increase exercise. CMP unremarkable. STD screening negative (GC, HIV, Hep C, syphilis).

## 2024-11-18 ENCOUNTER — TELEPHONE (OUTPATIENT)
Age: 44
End: 2024-11-18

## 2024-11-18 ENCOUNTER — OFFICE VISIT (OUTPATIENT)
Age: 44
End: 2024-11-18
Payer: COMMERCIAL

## 2024-11-18 VITALS
SYSTOLIC BLOOD PRESSURE: 120 MMHG | BODY MASS INDEX: 26.13 KG/M2 | DIASTOLIC BLOOD PRESSURE: 72 MMHG | OXYGEN SATURATION: 98 % | WEIGHT: 157 LBS | TEMPERATURE: 98.2 F | HEART RATE: 87 BPM

## 2024-11-18 DIAGNOSIS — G89.29 CHRONIC MALE PELVIC PAIN: ICD-10-CM

## 2024-11-18 DIAGNOSIS — R10.2 CHRONIC MALE PELVIC PAIN: ICD-10-CM

## 2024-11-18 DIAGNOSIS — J34.89 RHINORRHEA: ICD-10-CM

## 2024-11-18 DIAGNOSIS — R05.1 ACUTE COUGH: ICD-10-CM

## 2024-11-18 DIAGNOSIS — F52.4 PREMATURE EJACULATION: Primary | ICD-10-CM

## 2024-11-18 PROCEDURE — 99213 OFFICE O/P EST LOW 20 MIN: CPT

## 2024-11-18 RX ORDER — FLUTICASONE PROPIONATE 50 MCG
1 SPRAY, SUSPENSION (ML) NASAL DAILY
Qty: 11.1 ML | Refills: 1 | Status: SHIPPED | OUTPATIENT
Start: 2024-11-18

## 2024-11-18 RX ORDER — BENZONATATE 100 MG/1
100 CAPSULE ORAL 3 TIMES DAILY PRN
Qty: 20 CAPSULE | Refills: 0 | Status: SHIPPED | OUTPATIENT
Start: 2024-11-18

## 2024-11-18 RX ORDER — SERTRALINE HYDROCHLORIDE 25 MG/1
25 TABLET, FILM COATED ORAL DAILY
Qty: 90 TABLET | Refills: 0 | Status: CANCELLED | OUTPATIENT
Start: 2024-11-18 | End: 2025-02-16

## 2024-11-18 RX ORDER — PAROXETINE 10 MG/1
10 TABLET, FILM COATED ORAL DAILY
Qty: 30 TABLET | Refills: 1 | Status: SHIPPED | OUTPATIENT
Start: 2024-11-18

## 2024-11-18 NOTE — PROGRESS NOTES
Name: Don Koehler      : 1980      MRN: [id number]  Encounter Provider: Jenifer Barriga DO  Encounter Date: 2024   Encounter department: [facility]  :  Assessment & Plan  Premature ejaculation  -Reports ongoing premature ejaculation and chronic pelvic pain  -Was seen by urology on 2024: Prior MRI was unremarkable, had several ultrasounds which were also unremarkable-ordered Medrol Dosepak and Cialis  -Testosterone few years ago was 360, urologist will be rechecking this as well as FSH, LH, and prolactin  -Although patient denies marital issues, may have psychogenic component to premature ejaculation; issue is causing distress in his life, and patient is open to therapy-referral provided  -Start paroxetine 10 mg daily; discussed side effects, dangers of abruptly stopping medication, and full effect may take 4 to 6 weeks  -Advised patient to reach out to me if he does not have any improvement in about 2 to 3 weeks as we may need to increase dose of medication  -Return in 1 month for follow up   Orders:    Ambulatory referral to Psych Services; Future    PARoxetine (PAXIL) 10 mg tablet; Take 1 tablet (10 mg total) by mouth daily    Chronic male pelvic pain  See premature ejaculation       Rhinorrhea  Likely viral URI  Recommended supportive measures, such as rest, adequate hydration, honey and tea, gargling with salt water, lozenges as needed  Prescribed Flonase and Tessalon Perles  Advised to follow-up if worsens or does not improve  Orders:    fluticasone (FLONASE) 50 mcg/act nasal spray; 1 spray into each nostril daily    Acute cough  See rhinorrhea  Orders:    benzonatate (TESSALON PERLES) 100 mg capsule; Take 1 capsule (100 mg total) by mouth 3 (three) times a day as needed for cough           History of Present Illness     HPI  Patient is a 44-year-old [place]inyar[place]-speaking male with past medical history of erectile dysfunction, hyperlipidemia, chronic male  pelvic pain that reports to the clinic for follow-up of premature ejaculation, pelvic pain, and cold-like symptoms.  Patient was seen by urology, and he reports that he was prescribed an erectile disc function medication as well as a steroid pack.  He feels that the steroid pack did not help much and he continues to have pelvic pain.  Patient notes that he does get erections at night, upon waking, and during sexual intercourse, but has premature ejaculation.  Patient denies any marital issues.  Patient also comes in with cold-like symptoms.  He has been having a runny nose and cough for about a week.  Reports he is having chills, denies fevers.  Denies NVD.    Review of Systems   Constitutional:  Negative for chills and fever.   HENT:  Positive for rhinorrhea. Negative for ear pain and sore throat.    Eyes:  Negative for pain and visual disturbance.   Respiratory:  Positive for cough. Negative for shortness of breath.    Cardiovascular:  Negative for chest pain and palpitations.   Gastrointestinal:  Negative for constipation, diarrhea and vomiting.   Genitourinary:  Negative for dysuria and hematuria.        Pelvic pain, premature ejaculation   Musculoskeletal:  Negative for arthralgias and back pain.   Skin:  Negative for color change and rash.   Neurological:  Negative for seizures and syncope.   All other systems reviewed and are negative.    Current Outpatient Medications on File Prior to Visit   Medication Sig Dispense Refill    methylPREDNISolone 4 MG tablet therapy pack Use as directed on package 21 each 0    nystatin (MYCOSTATIN) powder Apply topically 4 (four) times a day 60 g 0    tadalafil (CIALIS) 5 MG tablet Take 1 tablet (5 mg total) by mouth daily 30 tablet 6    clotrimazole-betamethasone (LOTRISONE) 1-0.05 % cream Apply topically 2 (two) times a day for 14 days 45 g 0     No current facility-administered medications on file prior to visit.      Social History     Tobacco Use    Smoking status: Never     Smokeless tobacco: Never   Vaping Use    Vaping status: Never Used   Substance and Sexual Activity    Alcohol use: No    Drug use: No    Sexual activity: Not on file        Objective   /72   Pulse 87   Temp 98.2 °F (36.8 °C)   Wt 71.2 kg (157 lb)   SpO2 98%   BMI 26.13 kg/m²      Physical Exam  Vitals reviewed.   Constitutional:       General: He is not in acute distress.     Appearance: Normal appearance. He is not ill-appearing, toxic-appearing or diaphoretic.   HENT:      Head: Normocephalic and atraumatic.      Right Ear: Tympanic membrane, ear canal and external ear normal.      Left Ear: Tympanic membrane, ear canal and external ear normal.      Nose: Rhinorrhea present.      Mouth/Throat:      Pharynx: Oropharynx is clear. No oropharyngeal exudate or posterior oropharyngeal erythema.   Eyes:      Extraocular Movements: Extraocular movements intact.      Conjunctiva/sclera: Conjunctivae normal.      Pupils: Pupils are equal, round, and reactive to light.   Cardiovascular:      Rate and Rhythm: Normal rate and regular rhythm.      Pulses: Normal pulses.      Heart sounds: Normal heart sounds.   Pulmonary:      Effort: Pulmonary effort is normal.      Breath sounds: Normal breath sounds.   Abdominal:      General: Abdomen is flat. There is no distension.      Palpations: Abdomen is soft.      Tenderness: There is abdominal tenderness. There is no guarding or rebound.      Comments: Mild tenderness to palpation at midline superior to pubic region   Musculoskeletal:         General: Normal range of motion.      Cervical back: Normal range of motion and neck supple.      Right lower leg: No edema.      Left lower leg: No edema.   Lymphadenopathy:      Cervical: No cervical adenopathy.   Skin:     General: Skin is warm.   Neurological:      Mental Status: He is alert and oriented to person, place, and time. Mental status is at baseline.   Psychiatric:         Mood and Affect: Mood normal.          Behavior: Behavior normal.

## 2024-11-19 ENCOUNTER — TELEPHONE (OUTPATIENT)
Age: 44
End: 2024-11-19

## 2024-11-19 NOTE — TELEPHONE ENCOUNTER
Writer contacted pt regarding referral for [facility] to verify services needed and, if pt speaks english, schedule an appt. Pt stated that he speaks just a bit english and he would prefers a therapist who speaks Kyrgy[place]. Writer verbalize understanding and informed pt that his referral and preferences will be transfer to the Intake Dept, that he will be place on wait list and will receive a call if there is any availability.

## 2024-11-20 ENCOUNTER — OFFICE VISIT (OUTPATIENT)
Dept: DENTISTRY | Facility: CLINIC | Age: 44
End: 2024-11-20

## 2024-11-20 VITALS — SYSTOLIC BLOOD PRESSURE: 122 MMHG | HEART RATE: 80 BPM | DIASTOLIC BLOOD PRESSURE: 69 MMHG

## 2024-11-20 DIAGNOSIS — K05.6 PERIODONTAL DISEASE: Primary | ICD-10-CM

## 2024-11-20 PROCEDURE — D4910 PERIODONTAL MAINTENANCE: HCPCS

## 2024-11-20 PROCEDURE — D0120 PERIODIC ORAL EVALUATION - ESTABLISHED PATIENT: HCPCS

## 2024-11-20 NOTE — TELEPHONE ENCOUNTER
Per chart review, patient has been added to talk therapy wait list and referral will be closed/completed.

## 2024-11-20 NOTE — PROGRESS NOTES
Periodic exam, Perio Maintenance   REVIEWED MED HX: meds, allergies, health changes reviewed in EPIC  CHIEF CONCERN:  none   PAIN SCALE:  0  ASA CLASS:  ASA 1 - Normal health patient  PLAQUE:  mild  CALCULUS:  Light  BLEEDING:  moderate  STAIN :   None  PERIO: 3B-probe readings 2-4 mm    Hygiene Procedures: Scaled, Polished, Flossed and Used Cavitron    Oral Hygiene Instruction: Brushing Minimum 2x daily for 2 minutes, daily flossing    Visual and Tactile Intraoral/ Extraoral evaluation: Oral and Oropharyngeal cancer evaluation. No findings     REFERRALS: none    EXAM: Dr. Bob    CARIES FINDINGS: no decay noted    Next Recall: 3 month perio maintenance/periodic exam    Last BWX: 12/29/23  Last Panorex/ FMX : 12/29/23

## 2024-12-23 ENCOUNTER — OFFICE VISIT (OUTPATIENT)
Age: 44
End: 2024-12-23
Payer: COMMERCIAL

## 2024-12-23 VITALS
WEIGHT: 159 LBS | SYSTOLIC BLOOD PRESSURE: 132 MMHG | BODY MASS INDEX: 26.46 KG/M2 | TEMPERATURE: 97.6 F | HEART RATE: 87 BPM | OXYGEN SATURATION: 99 % | DIASTOLIC BLOOD PRESSURE: 74 MMHG

## 2024-12-23 DIAGNOSIS — F52.4 PREMATURE EJACULATION: ICD-10-CM

## 2024-12-23 DIAGNOSIS — R10.2 SUPRAPUBIC PAIN: Primary | ICD-10-CM

## 2024-12-23 PROCEDURE — 99213 OFFICE O/P EST LOW 20 MIN: CPT

## 2024-12-23 RX ORDER — PAROXETINE 10 MG/1
20 TABLET, FILM COATED ORAL DAILY
Qty: 60 TABLET | Refills: 1 | Status: SHIPPED | OUTPATIENT
Start: 2024-12-23

## 2024-12-23 NOTE — PROGRESS NOTES
Name: Don Koehler      : 1980      MRN: [id number]  Encounter Provider: Jenifer Barriga DO  Encounter Date: 2024   [facility]  :  Assessment & Plan  Suprapubic pain  Presents with chronic history of suprapubic pain, reports pain occurs when sitting or with pressure to area  Follows with urology, has had negative workup in the past  Last CT abdomen pelvis was 1.5 years ago and was unremarkable, MRI pelvis in March of this year was unremarkable  Tenderness to palpation at midline suprapubic region as well as to the right of midline  Ordered CT abdomen pelvis with and without contrast  Orders:  •  CT abdomen pelvis w wo contrast; Future    Premature ejaculation  Reports no significant improvement at dose of 10 mg daily, will increase to 20 mg daily  Orders:  •  PARoxetine (PAXIL) 10 mg tablet; Take 2 tablets (20 mg total) by mouth daily      Return to office in 3 to 4 weeks to reassess.  Provided strict ED/return to office precautions.     History of Present Illness {?Quick Links Encounters * My Last Note * Last Note in Specialty * Snapshot * Since Last Visit * History :82632}    HPI  Patient is a 44-year-old male with past medical history of chronic male pelvic pain, erectile dysfunction, low libido, HLD that presents to the office today for suprapubic pain.  Patient reports that this has been ongoing for the past 2 years.  States that it only occurs when he sits down or presses on the area.  No pain radiation, rates pain as 8/10 but is intermittent.  Reports OTC pain medications does not fully help.  Does not want to be prescribed other pain medications at this time.  States that SSRI medication has not shown much improvement for premature ejaculation.     Review of Systems   Constitutional:  Negative for chills and fever.   HENT:  Negative for ear pain and sore throat.    Eyes:  Negative for pain and visual disturbance.   Respiratory:  Negative  for cough and shortness of breath.    Cardiovascular:  Negative for chest pain and palpitations.   Gastrointestinal:  Positive for abdominal pain. Negative for vomiting.        Suprapubic pain when sitting or palpating area   Genitourinary:  Negative for dysuria and hematuria.        Premature ejaculation   Musculoskeletal:  Negative for arthralgias and joint swelling.   Skin:  Negative for color change and rash.   Neurological:  Negative for seizures and syncope.   All other systems reviewed and are negative.      Objective {?Quick Links Trend Vitals * Enter New Vitals * Results Review * Timeline (Adult) * Labs * Imaging * Cardiology * Procedures * Lung Cancer Screening * Surgical eConsent :10670}  /74   Pulse 87   Temp 97.6 °F (36.4 °C)   Wt 72.1 kg (159 lb)   SpO2 99%   BMI 26.46 kg/m²      Physical Exam  Vitals reviewed.   Constitutional:       Appearance: Normal appearance. He is not ill-appearing, toxic-appearing or diaphoretic.   HENT:      Head: Normocephalic and atraumatic.      Right Ear: External ear normal.      Left Ear: External ear normal.      Nose: Nose normal.      Mouth/Throat:      Pharynx: Oropharynx is clear.   Eyes:      Conjunctiva/sclera: Conjunctivae normal.      Pupils: Pupils are equal, round, and reactive to light.   Cardiovascular:      Rate and Rhythm: Normal rate and regular rhythm.      Pulses: Normal pulses.      Heart sounds: Normal heart sounds.   Pulmonary:      Effort: Pulmonary effort is normal.      Breath sounds: Normal breath sounds.   Abdominal:      General: Abdomen is flat. There is no distension.      Palpations: Abdomen is soft.      Tenderness: There is abdominal tenderness. There is no guarding or rebound.      Comments: Tenderness to palpation suprapubic at midline and to right of midline   Musculoskeletal:         General: Normal range of motion.      Cervical back: Neck supple.      Right lower leg: No edema.      Left lower leg: No edema.   Skin:      General: Skin is warm.   Neurological:      Mental Status: He is alert and oriented to person, place, and time. Mental status is at baseline.   Psychiatric:         Mood and Affect: Mood normal.         Behavior: Behavior normal.

## 2025-01-31 ENCOUNTER — OFFICE VISIT (OUTPATIENT)
Age: 45
End: 2025-01-31
Payer: COMMERCIAL

## 2025-01-31 VITALS
SYSTOLIC BLOOD PRESSURE: 122 MMHG | BODY MASS INDEX: 26.63 KG/M2 | DIASTOLIC BLOOD PRESSURE: 78 MMHG | TEMPERATURE: 97.8 F | HEART RATE: 88 BPM | WEIGHT: 160 LBS | OXYGEN SATURATION: 98 %

## 2025-01-31 DIAGNOSIS — N52.01 ERECTILE DYSFUNCTION DUE TO ARTERIAL INSUFFICIENCY: ICD-10-CM

## 2025-01-31 DIAGNOSIS — B35.6 TINEA CRURIS: Primary | ICD-10-CM

## 2025-01-31 DIAGNOSIS — R10.2 CHRONIC MALE PELVIC PAIN: ICD-10-CM

## 2025-01-31 DIAGNOSIS — B35.3 TINEA PEDIS OF BOTH FEET: ICD-10-CM

## 2025-01-31 DIAGNOSIS — F52.4 PREMATURE EJACULATION: ICD-10-CM

## 2025-01-31 DIAGNOSIS — G89.29 CHRONIC MALE PELVIC PAIN: ICD-10-CM

## 2025-01-31 PROCEDURE — 99213 OFFICE O/P EST LOW 20 MIN: CPT

## 2025-01-31 RX ORDER — FLUCONAZOLE 100 MG/1
100 TABLET ORAL DAILY
Qty: 21 TABLET | Refills: 0 | Status: SHIPPED | OUTPATIENT
Start: 2025-01-31 | End: 2025-02-21

## 2025-01-31 RX ORDER — OXICONAZOLE NITRATE 10 MG/G
CREAM TOPICAL 2 TIMES DAILY
Qty: 60 G | Refills: 0 | Status: SHIPPED | OUTPATIENT
Start: 2025-01-31 | End: 2025-02-21

## 2025-01-31 RX ORDER — PAROXETINE 10 MG/1
40 TABLET, FILM COATED ORAL DAILY
Qty: 360 TABLET | Refills: 0 | Status: SHIPPED | OUTPATIENT
Start: 2025-01-31

## 2025-01-31 NOTE — PATIENT INSTRUCTIONS
Please have your son call the central scheduling number ([phone]) to set up your CT abdomen pelvis imaging.     We changed your paxil medication from 20 mg to 40 mg daily.

## 2025-01-31 NOTE — ASSESSMENT & PLAN NOTE
Reports chronic pelvic pain has improved, advised to take Tylenol or ibuprofen as needed for pain  Ordered CT abdomen pelvis at last office visit but patient was unable to get this done due to inability to set up appointment as a Eritrean speaker, advised patient to have son call to set up an appointment so that we can get this imaging done

## 2025-01-31 NOTE — PROGRESS NOTES
Name: Don Koehler      : 1980      MRN: [id number]  Encounter Provider: Jenifer Barriga DO  Encounter Date: 2025   Encounter department: [facility] [place] [facility]  :  Assessment & Plan  Tinea cruris  Has failed multiple medications such as creams and powders, ordered Diflucan for patient to take for the next 3 weeks in combination with Oxistat twice daily to penis and feet  Advised patient to avoid sexual intercourse and to ensure that patient's wife is also being treated  Orders:  •  fluconazole (DIFLUCAN) 100 mg tablet; Take 1 tablet (100 mg total) by mouth daily for 21 days  •  oxiconazole (OXISTAT) 1 % CREA; Apply topically 2 (two) times a day for 21 days Apply to penis and feet twice daily for 21 days.    Tinea pedis of both feet  Ordered Diflucan to take daily in combination with Oxistat given persistence of fungal infections  Advised patient to clean showers to avoid reinfection as well as to ensure wife is being treated as well  Orders:  •  fluconazole (DIFLUCAN) 100 mg tablet; Take 1 tablet (100 mg total) by mouth daily for 21 days  •  oxiconazole (OXISTAT) 1 % CREA; Apply topically 2 (two) times a day for 21 days Apply to penis and feet twice daily for 21 days.    Premature ejaculation  Reports ongoing premature ejaculation during sexual intercourse with wife, 20 mg daily was not working well, will increase dose to 40 mg daily  Reassess at next office visit in 3 weeks  Orders:  •  PARoxetine (PAXIL) 10 mg tablet; Take 4 tablets (40 mg total) by mouth daily    Erectile dysfunction due to arterial insufficiency  Continue Cialis 5 mg daily as prescribed by urology       Chronic male pelvic pain  Reports chronic pelvic pain has improved, advised to take Tylenol or ibuprofen as needed for pain  Ordered CT abdomen pelvis at last office visit but patient was unable to get this done due to inability to set up appointment as a [place] speaker, advised patient to have son call to  set up an appointment so that we can get this imaging done         Return in 3 weeks to reassess above     History of Present Illness {?Quick Links Encounters * My Last Note * Last Note in Specialty * Snapshot * Since Last Visit * History :48423}  HPI  Patient is a 43 yo M with PMHx of chronic pelvic pain, mixed hyperlipidemia, tinea corporis, erectile dysfunction, pain in left testicle that presents to the office today for follow-up.  Patient reports that he still continues to have premature ejaculation as well as erectile dysfunction.  Also has penile itching especially after intercourse.  Reports that his powder helped somewhat but then comes back.  Wife has been needing to be treated with over-the-counter Monistat for yeast infections.  Denies any current abdominal pain.      Review of Systems   Constitutional:  Negative for chills and fever.   HENT:  Negative for ear pain and sore throat.    Eyes:  Negative for pain and visual disturbance.   Respiratory:  Negative for cough and shortness of breath.    Cardiovascular:  Negative for chest pain and palpitations.   Gastrointestinal:  Negative for abdominal pain and vomiting.   Genitourinary:  Negative for dysuria and hematuria.        Penile itching, premature ejaculation, erectile dysfunction   Musculoskeletal:  Negative for arthralgias and back pain.        Occasional pelvic pain, itching of feet bilaterally mostly at toes   Skin:  Negative for color change and rash.   Neurological:  Negative for seizures and syncope.   Psychiatric/Behavioral:  Negative for agitation and confusion.    All other systems reviewed and are negative.      Objective {?Quick Links Trend Vitals * Enter New Vitals * Results Review * Timeline (Adult) * Labs * Imaging * Cardiology * Procedures * Lung Cancer Screening * Surgical eConsent :77786}  /78   Pulse 88   Temp 97.8 °F (36.6 °C)   Wt 72.6 kg (160 lb)   SpO2 98%   BMI 26.63 kg/m²      Physical Exam  Constitutional:        General: He is not in acute distress.     Appearance: Normal appearance.   HENT:      Head: Normocephalic and atraumatic.      Right Ear: External ear normal.      Left Ear: External ear normal.      Nose: Nose normal.      Mouth/Throat:      Pharynx: Oropharynx is clear.   Eyes:      Extraocular Movements: Extraocular movements intact.      Conjunctiva/sclera: Conjunctivae normal.      Pupils: Pupils are equal, round, and reactive to light.   Cardiovascular:      Rate and Rhythm: Normal rate and regular rhythm.      Pulses: Normal pulses.      Heart sounds: Normal heart sounds.   Pulmonary:      Effort: Pulmonary effort is normal.      Breath sounds: Normal breath sounds.   Abdominal:      General: Abdomen is flat.      Palpations: Abdomen is soft.   Musculoskeletal:         General: Normal range of motion.      Cervical back: Normal range of motion and neck supple.      Right lower leg: No edema.      Left lower leg: No edema.   Skin:     General: Skin is warm.      Comments: Small amount of possible fungal noted on several toes bilaterally, deferred genital exam today   Neurological:      Mental Status: He is alert and oriented to person, place, and time. Mental status is at baseline.   Psychiatric:         Mood and Affect: Mood normal.         Behavior: Behavior normal.

## 2025-01-31 NOTE — ASSESSMENT & PLAN NOTE
Reports ongoing premature ejaculation during sexual intercourse with wife, 20 mg daily was not working well, will increase dose to 40 mg daily  Reassess at next office visit in 3 weeks  Orders:  •  PARoxetine (PAXIL) 10 mg tablet; Take 4 tablets (40 mg total) by mouth daily

## 2025-01-31 NOTE — ASSESSMENT & PLAN NOTE
Ordered Diflucan to take daily in combination with Oxistat given persistence of fungal infections  Advised patient to clean showers to avoid reinfection as well as to ensure wife is being treated as well  Orders:  •  fluconazole (DIFLUCAN) 100 mg tablet; Take 1 tablet (100 mg total) by mouth daily for 21 days  •  oxiconazole (OXISTAT) 1 % CREA; Apply topically 2 (two) times a day for 21 days Apply to penis and feet twice daily for 21 days.

## 2025-01-31 NOTE — ASSESSMENT & PLAN NOTE
Has failed multiple medications such as creams and powders, ordered Diflucan for patient to take for the next 3 weeks in combination with Oxistat twice daily to penis and feet  Advised patient to avoid sexual intercourse and to ensure that patient's wife is also being treated  Orders:  •  fluconazole (DIFLUCAN) 100 mg tablet; Take 1 tablet (100 mg total) by mouth daily for 21 days  •  oxiconazole (OXISTAT) 1 % CREA; Apply topically 2 (two) times a day for 21 days Apply to penis and feet twice daily for 21 days.

## 2025-03-03 ENCOUNTER — RA CDI HCC (OUTPATIENT)
Dept: OTHER | Facility: HOSPITAL | Age: 45
End: 2025-03-03

## 2025-03-03 NOTE — PROGRESS NOTES
HCC coding opportunities       Chart reviewed, no opportunity found: CHART REVIEWED, NO OPPORTUNITY FOUND        Patients Insurance        Commercial Insurance: [employer]

## 2025-03-06 ENCOUNTER — HOSPITAL ENCOUNTER (OUTPATIENT)
Dept: CT IMAGING | Facility: HOSPITAL | Age: 45
End: 2025-03-06
Payer: COMMERCIAL

## 2025-03-06 DIAGNOSIS — R10.2 SUPRAPUBIC PAIN: ICD-10-CM

## 2025-03-06 PROCEDURE — 74177 CT ABD & PELVIS W/CONTRAST: CPT

## 2025-03-06 RX ADMIN — IOHEXOL 50 ML: 350 INJECTION, SOLUTION INTRAVENOUS at 18:46

## 2025-03-10 ENCOUNTER — OFFICE VISIT (OUTPATIENT)
Age: 45
End: 2025-03-10
Payer: COMMERCIAL

## 2025-03-10 VITALS
WEIGHT: 157 LBS | OXYGEN SATURATION: 99 % | SYSTOLIC BLOOD PRESSURE: 120 MMHG | TEMPERATURE: 97.5 F | DIASTOLIC BLOOD PRESSURE: 78 MMHG | BODY MASS INDEX: 26.13 KG/M2 | HEART RATE: 82 BPM

## 2025-03-10 DIAGNOSIS — R10.2 CHRONIC MALE PELVIC PAIN: ICD-10-CM

## 2025-03-10 DIAGNOSIS — G89.29 CHRONIC MALE PELVIC PAIN: ICD-10-CM

## 2025-03-10 DIAGNOSIS — F52.4 PREMATURE EJACULATION: ICD-10-CM

## 2025-03-10 DIAGNOSIS — B35.6 TINEA CRURIS: ICD-10-CM

## 2025-03-10 DIAGNOSIS — B35.3 TINEA PEDIS OF BOTH FEET: Primary | ICD-10-CM

## 2025-03-10 PROCEDURE — 99213 OFFICE O/P EST LOW 20 MIN: CPT

## 2025-03-10 RX ORDER — OXICONAZOLE NITRATE 10 MG/G
CREAM TOPICAL 2 TIMES DAILY
Qty: 60 G | Refills: 0 | Status: SHIPPED | OUTPATIENT
Start: 2025-03-10 | End: 2025-03-31

## 2025-03-10 NOTE — PROGRESS NOTES
Name: Don Koehler      : 1980      MRN: [id number]  Encounter Provider: Jenifer Barriga DO  Encounter Date: 3/10/2025   [facility]  :  Assessment & Plan  Tinea pedis of both feet  Reports has improved but still has some nail fungus mostly on big toe bilaterally   Ordered refill   Return in 1 month to recheck   Orders:  •  oxiconazole (OXISTAT) 1 % CREA; Apply topically 2 (two) times a day for 21 days Apply to toenails twice daily for 21 days.    Tinea cruris  Resolved with oxistat   Orders:  •  oxiconazole (OXISTAT) 1 % CREA; Apply topically 2 (two) times a day for 21 days Apply to toenails twice daily for 21 days.    Premature ejaculation  Reports has improved since going from 30 to 40 mg paxil daily, continue at this dose        Chronic male pelvic pain  Completed CT abd pelv w contrast, still awaiting results  Reports would like to have us call his son to discuss results once they arrive   Continue otc pain medicine such as tylenol or ibuprofen as needed   Reports worse with belt and buttons on jeans, advised can make adjustment with clothing/belts         Return in 1 month to recheck above     History of Present Illness {?Quick Links Encounters * My Last Note * Last Note in Specialty * Snapshot * Since Last Visit * History :85880}  HPI  Patient is a 44-year-old Chinese-speaking male with past medical history of ED, BPPV, low libido, chronic male pelvic pain, premature ejaculation, HLD that presents to the clinic for follow-up for premature ejaculation, ED, pelvic pain, and foot fungus.  He reports the fungus on his penis has resolved, though still having some fungus on his toenails mostly big toes bilaterally.  Reports premature ejaculation has improved.  Still having chronic pelvic pain, worse with belt cameron and tight buttons on his pants.  Has some increased urination with increased hydration, denies any fevers/chills.  Reports urine has  yellow to clear, no blood in urine.  Denies nausea, vomiting, constipation.    Review of Systems   Constitutional:  Negative for chills and fever.   HENT:  Negative for ear pain and sore throat.    Eyes:  Negative for pain and visual disturbance.   Respiratory:  Negative for cough and shortness of breath.    Cardiovascular:  Negative for chest pain and palpitations.   Gastrointestinal:  Negative for abdominal pain and vomiting.   Genitourinary:  Positive for frequency and testicular pain. Negative for dysuria and hematuria.   Musculoskeletal:  Negative for arthralgias and back pain.   Skin:  Negative for color change and rash.   Neurological:  Negative for seizures and syncope.   All other systems reviewed and are negative.      Objective {?Quick Links Trend Vitals * Enter New Vitals * Results Review * Timeline (Adult) * Labs * Imaging * Cardiology * Procedures * Lung Cancer Screening * Surgical eConsent :43974}  /78   Pulse 82   Temp 97.5 °F (36.4 °C)   Wt 71.2 kg (157 lb)   SpO2 99%   BMI 26.13 kg/m²      Physical Exam  Vitals reviewed.   Constitutional:       Appearance: Normal appearance.   HENT:      Head: Normocephalic and atraumatic.      Right Ear: External ear normal.      Left Ear: External ear normal.      Nose: Nose normal.      Mouth/Throat:      Pharynx: Oropharynx is clear.   Eyes:      Extraocular Movements: Extraocular movements intact.      Conjunctiva/sclera: Conjunctivae normal.      Pupils: Pupils are equal, round, and reactive to light.   Cardiovascular:      Rate and Rhythm: Normal rate and regular rhythm.      Pulses: Normal pulses.      Heart sounds: Normal heart sounds.   Pulmonary:      Effort: Pulmonary effort is normal.      Breath sounds: Normal breath sounds.   Abdominal:      General: Abdomen is flat. There is no distension.      Palpations: Abdomen is soft.      Tenderness: There is no abdominal tenderness. There is no guarding or rebound.   Musculoskeletal:      Cervical  back: Normal range of motion.      Right lower leg: No edema.      Left lower leg: No edema.   Skin:     General: Skin is warm.   Neurological:      Mental Status: He is alert and oriented to person, place, and time. Mental status is at baseline.   Psychiatric:         Mood and Affect: Mood normal.         Behavior: Behavior normal.

## 2025-03-10 NOTE — ASSESSMENT & PLAN NOTE
Completed CT abd pelv w contrast, still awaiting results  Reports would like to have us call his son to discuss results once they arrive   Continue otc pain medicine such as tylenol or ibuprofen as needed   Reports worse with belt and buttons on jeans, advised can make adjustment with clothing/belts

## 2025-03-10 NOTE — ASSESSMENT & PLAN NOTE
Resolved with oxistat   Orders:  •  oxiconazole (OXISTAT) 1 % CREA; Apply topically 2 (two) times a day for 21 days Apply to toenails twice daily for 21 days.

## 2025-03-10 NOTE — ASSESSMENT & PLAN NOTE
Reports has improved but still has some nail fungus mostly on big toe bilaterally   Ordered refill   Return in 1 month to recheck   Orders:  •  oxiconazole (OXISTAT) 1 % CREA; Apply topically 2 (two) times a day for 21 days Apply to toenails twice daily for 21 days.

## 2025-03-17 ENCOUNTER — RESULTS FOLLOW-UP (OUTPATIENT)
Age: 45
End: 2025-03-17

## 2025-03-17 DIAGNOSIS — K42.9 UMBILICAL HERNIA WITHOUT OBSTRUCTION AND WITHOUT GANGRENE: Primary | ICD-10-CM

## 2025-03-17 NOTE — RESULT ENCOUNTER NOTE
Attempted to call patient's son to discuss results. Please call his son to inform him that his father's CT abdomen pelvis looked good except for a small fat-containing umbilical hernia, which may be contributing to the patient's abdominal pain. I will put in a referral to a general surgeon to further evaluate and assess management. Please let me know if any questions. travelfoxt message sent.     Thank you!     Dr. Barriga

## 2025-03-26 ENCOUNTER — CONSULT (OUTPATIENT)
Dept: SURGERY | Facility: CLINIC | Age: 45
End: 2025-03-26
Payer: COMMERCIAL

## 2025-03-26 VITALS
WEIGHT: 162 LBS | SYSTOLIC BLOOD PRESSURE: 132 MMHG | DIASTOLIC BLOOD PRESSURE: 108 MMHG | BODY MASS INDEX: 26.99 KG/M2 | HEIGHT: 65 IN | HEART RATE: 86 BPM | TEMPERATURE: 98 F | OXYGEN SATURATION: 98 %

## 2025-03-26 DIAGNOSIS — K42.9 UMBILICAL HERNIA WITHOUT OBSTRUCTION AND WITHOUT GANGRENE: ICD-10-CM

## 2025-03-26 DIAGNOSIS — Z01.818 ENCOUNTER FOR PREADMISSION TESTING: Primary | ICD-10-CM

## 2025-03-26 PROCEDURE — 99204 OFFICE O/P NEW MOD 45 MIN: CPT | Performed by: SURGERY

## 2025-03-27 NOTE — PROGRESS NOTES
Name: Don Koehler      : 1980      MRN: [id number]  Encounter Provider: Boyd Carbajal MD  Encounter Date: 3/26/2025   Encounter department: [facility] GENERAL SURGERY [place]  :  Assessment & Plan  Umbilical hernia without obstruction and without gangrene    Orders:    Ambulatory Referral to General Surgery    Case request operating room: REPAIR HERNIA UMBILICAL; Standing    Encounter for preadmission testing    Orders:    CBC and differential; Future    Comprehensive metabolic panel; Future    ECG 12 lead; Future  Patient would like to have his umbilical hernia repair.  Will do an open repair of umbilical hernia with mesh on May 8, 2025.  He signed the consent.  I explained to him that by this repair we are not going to be able to change the nature of the supra pubic pain as it is not related.  He says that he is okay with that.  All the conversation was had using a  service.  I advised him to follow-up with his urologist regarding the suprapubic pain.    I also reviewed the images of the CT scan with him.  History of Present Illness   44-year-old male patient came to my office with complaints of supra pubic pain.  He says that to investigate that pain a CT scan was done which showed an umbilical hernia.  He has no complaints of nausea or vomiting.  No complaints of change in bowel movements.  He says his suprapubic pain was addressed by the urologist.  To target that he underwent a left varicocelectomy and circumcision.  He did not have much improvement.  Patient however wants his umbilical hernia to be repaired.      Don Koehler is a 44 y.o. male who presents   History obtained from: patient    Review of Systems   Constitutional: Negative.    HENT: Negative.     Eyes: Negative.    Respiratory: Negative.     Cardiovascular: Negative.    Gastrointestinal: Negative.         Suprapubic pain   Endocrine: Negative.    Genitourinary: Negative.    Musculoskeletal: Negative.     Skin: Negative.    Allergic/Immunologic: Negative.    Neurological: Negative.    Hematological: Negative.    Psychiatric/Behavioral: Negative.       Medical History Reviewed by provider this encounter:  Tobacco  Allergies  Meds  Problems  Med Hx  Surg Hx  Fam Hx     .  Past Medical History   Past Medical History:   Diagnosis Date    Back pain     SOB (shortness of breath)      Past Surgical History:   Procedure Laterality Date    MI CIRCUMCISION AGE >28 DAYS N/A 2/16/2023    Procedure: CIRCUMCISION;  Surgeon: Gil Luke MD;  Location: [facility];  Service: Urology    MI EXC VARICOCELE/LIGATION SPERMATIC VEINS SPX Left 2/16/2023    Procedure: VARICOCELECTOMY;  Surgeon: Gil Luke MD;  Location: [facility];  Service: Urology     Family History   Problem Relation Age of Onset    No Known Problems Mother     No Known Problems Father       reports that he has never smoked. He has never used smokeless tobacco. He reports that he does not drink alcohol and does not use drugs.  Current Outpatient Medications   Medication Instructions    benzonatate (TESSALON PERLES) 100 mg, Oral, 3 times daily PRN    fluticasone (FLONASE) 50 mcg/act nasal spray 1 spray, Nasal, Daily    methylPREDNISolone 4 MG tablet therapy pack Use as directed on package    oxiconazole (OXISTAT) 1 % CREA Topical, 2 times daily, Apply to toenails twice daily for 21 days.    PARoxetine (PAXIL) 40 mg, Oral, Daily    tadalafil (CIALIS) 5 mg, Oral, Daily   No Known Allergies   Current Outpatient Medications on File Prior to Visit   Medication Sig Dispense Refill    benzonatate (TESSALON PERLES) 100 mg capsule Take 1 capsule (100 mg total) by mouth 3 (three) times a day as needed for cough 20 capsule 0    fluticasone (FLONASE) 50 mcg/act nasal spray 1 spray into each nostril daily 11.1 mL 1    methylPREDNISolone 4 MG tablet therapy pack Use as directed on package 21 each 0    oxiconazole (OXISTAT) 1 % CREA Apply topically 2 (two)  "times a day for 21 days Apply to toenails twice daily for 21 days. 60 g 0    PARoxetine (PAXIL) 10 mg tablet Take 4 tablets (40 mg total) by mouth daily 360 tablet 0    tadalafil (CIALIS) 5 MG tablet Take 1 tablet (5 mg total) by mouth daily 30 tablet 6     No current facility-administered medications on file prior to visit.      Social History     Tobacco Use    Smoking status: Never    Smokeless tobacco: Never   Vaping Use    Vaping status: Never Used   Substance and Sexual Activity    Alcohol use: No    Drug use: No    Sexual activity: Not on file        Objective   BP (!) 132/108 (BP Location: Left arm, Patient Position: Sitting, Cuff Size: Standard)   Pulse 86   Temp 98 °F (36.7 °C) (Tympanic)   Ht 5' 5\" (1.651 m) Comment: provided by pt  Wt 73.5 kg (162 lb)   SpO2 98%   BMI 26.96 kg/m²      Physical Exam  Vitals reviewed.   Constitutional:       Appearance: Normal appearance.   HENT:      Head: Normocephalic and atraumatic.      Nose: Nose normal.   Eyes:      Pupils: Pupils are equal, round, and reactive to light.   Cardiovascular:      Rate and Rhythm: Normal rate and regular rhythm.   Pulmonary:      Effort: Pulmonary effort is normal.      Breath sounds: Normal breath sounds.   Abdominal:      General: Bowel sounds are normal. There is no distension.      Palpations: Abdomen is soft.      Tenderness: There is no abdominal tenderness. There is no guarding or rebound.      Hernia: A hernia (Reducible umbilical hernia containing omentum.) is present.   Genitourinary:     Penis: Normal.       Testes: Normal.   Musculoskeletal:         General: Normal range of motion.      Cervical back: Normal range of motion.   Skin:     General: Skin is warm.   Neurological:      General: No focal deficit present.      Mental Status: He is alert and oriented to person, place, and time.           "

## 2025-03-31 ENCOUNTER — RA CDI HCC (OUTPATIENT)
Dept: OTHER | Facility: HOSPITAL | Age: 45
End: 2025-03-31

## 2025-03-31 NOTE — PROGRESS NOTES
HCC coding opportunities       Chart reviewed, no opportunity found: CHART REVIEWED, NO OPPORTUNITY FOUND        Patients Insurance        Commercial Insurance: [employer]

## 2025-04-07 ENCOUNTER — OFFICE VISIT (OUTPATIENT)
Age: 45
End: 2025-04-07
Payer: COMMERCIAL

## 2025-04-07 VITALS
OXYGEN SATURATION: 99 % | TEMPERATURE: 98.2 F | WEIGHT: 162 LBS | BODY MASS INDEX: 26.99 KG/M2 | HEIGHT: 65 IN | HEART RATE: 75 BPM | SYSTOLIC BLOOD PRESSURE: 124 MMHG | DIASTOLIC BLOOD PRESSURE: 72 MMHG

## 2025-04-07 DIAGNOSIS — F52.4 PREMATURE EJACULATION: ICD-10-CM

## 2025-04-07 DIAGNOSIS — B35.6 TINEA CRURIS: Primary | ICD-10-CM

## 2025-04-07 DIAGNOSIS — B35.3 TINEA PEDIS OF BOTH FEET: ICD-10-CM

## 2025-04-07 PROCEDURE — 99213 OFFICE O/P EST LOW 20 MIN: CPT

## 2025-04-07 RX ORDER — FLUCONAZOLE 100 MG/1
100 TABLET ORAL DAILY
Qty: 21 TABLET | Refills: 0 | Status: SHIPPED | OUTPATIENT
Start: 2025-04-07 | End: 2025-04-28

## 2025-04-07 NOTE — ASSESSMENT & PLAN NOTE
- Continued groin itching for several months  - Has tried lotrisone cream, nystatin powder without relief   - Previously was prescribed diflucan 100 mg daily for 21 days with resolution of symptoms   - Currently taking oxistat cream with some relief but has recurred   - Will trial another round of diflucan 100 mg daily for 21 days but will also put in referral to dermatology for further evaluation and management   - Return in 1 month to reassess   Orders:  •  fluconazole (DIFLUCAN) 100 mg tablet; Take 1 tablet (100 mg total) by mouth daily for 21 days  •  Ambulatory Referral to Dermatology; Future

## 2025-04-07 NOTE — ASSESSMENT & PLAN NOTE
Reports improved premature ejaculation since increasing paxil   Continue paxil 40 mg daily, patient would like to remain at this dose

## 2025-04-07 NOTE — PROGRESS NOTES
Name: Don Koehler      : 1980      MRN: [id number]  Encounter Provider: Jenifer Barriga DO  Encounter Date: 2025   [place] [facility]  :  Assessment & Plan  Tinea cruris  - Continued groin itching for several months  - Has tried lotrisone cream, nystatin powder without relief   - Previously was prescribed diflucan 100 mg daily for 21 days with resolution of symptoms   - Currently taking oxistat cream with some relief but has recurred   - Will trial another round of diflucan 100 mg daily for 21 days but will also put in referral to dermatology for further evaluation and management   - Return in 1 month to reassess   Orders:  •  fluconazole (DIFLUCAN) 100 mg tablet; Take 1 tablet (100 mg total) by mouth daily for 21 days  •  Ambulatory Referral to Dermatology; Future    Tinea pedis of both feet  Was using oxistat cream since last office visit   Resolved per patient        Premature ejaculation  Reports improved premature ejaculation since increasing paxil   Continue paxil 40 mg daily, patient would like to remain at this dose               History of Present Illness {?Quick Links Encounters * My Last Note * Last Note in Specialty * Snapshot * Since Last Visit * History :40256}  HPI  Patient is a 43 yo M with PMHx of chronic male pelvic pain, tinea cruris, HLD, and premature ejaculation that presents for follow up. Patient reports that his groin itching has returned. The oxistat cream that he has been using helps somewhat but has not completely resolved. Would like to try diflucan oral medication since that helped before. Reports that his nail fungus has resolved. States that he feels well at his current dose of paxil for premature ejaculation, feels it has improved since increasing the dose.     Review of Systems   Constitutional:  Negative for chills and fever.   HENT:  Negative for ear pain and sore throat.    Eyes:  Negative for pain and visual  "disturbance.   Respiratory:  Negative for cough and shortness of breath.    Cardiovascular:  Negative for chest pain and palpitations.   Gastrointestinal:  Negative for abdominal pain and vomiting.   Genitourinary:  Negative for dysuria and hematuria.   Musculoskeletal:  Negative for arthralgias and back pain.   Skin:  Negative for color change and rash.        Groin itching    Neurological:  Negative for seizures and syncope.   All other systems reviewed and are negative.      Objective {?Quick Links Trend Vitals * Enter New Vitals * Results Review * Timeline (Adult) * Labs * Imaging * Cardiology * Procedures * Lung Cancer Screening * Surgical eConsent :87150}  /72   Pulse 75   Temp 98.2 °F (36.8 °C)   Ht 5' 5\" (1.651 m)   Wt 73.5 kg (162 lb)   SpO2 99%   BMI 26.96 kg/m²      Physical Exam  Vitals reviewed.   Constitutional:       Appearance: Normal appearance.   HENT:      Head: Normocephalic and atraumatic.      Right Ear: External ear normal.      Left Ear: External ear normal.      Nose: Nose normal.      Mouth/Throat:      Pharynx: Oropharynx is clear.   Eyes:      Conjunctiva/sclera: Conjunctivae normal.      Pupils: Pupils are equal, round, and reactive to light.   Cardiovascular:      Rate and Rhythm: Normal rate and regular rhythm.      Pulses: Normal pulses.      Heart sounds: Normal heart sounds.   Pulmonary:      Effort: Pulmonary effort is normal.      Breath sounds: Normal breath sounds.   Abdominal:      General: Abdomen is flat. There is no distension.      Palpations: Abdomen is soft.      Tenderness: There is no abdominal tenderness. There is no guarding or rebound.   Musculoskeletal:      Cervical back: Neck supple.      Right lower leg: No edema.      Left lower leg: No edema.   Skin:     General: Skin is warm.   Neurological:      Mental Status: He is alert and oriented to person, place, and time. Mental status is at baseline.   Psychiatric:         Mood and Affect: Mood normal.    "      Behavior: Behavior normal.

## 2025-04-28 DIAGNOSIS — F52.4 PREMATURE EJACULATION: ICD-10-CM

## 2025-04-28 RX ORDER — PAROXETINE 10 MG/1
40 TABLET, FILM COATED ORAL DAILY
Qty: 360 TABLET | Refills: 0 | Status: SHIPPED | OUTPATIENT
Start: 2025-04-28

## 2025-04-29 ENCOUNTER — OFFICE VISIT (OUTPATIENT)
Dept: DENTISTRY | Facility: CLINIC | Age: 45
End: 2025-04-29

## 2025-04-29 VITALS — SYSTOLIC BLOOD PRESSURE: 120 MMHG | DIASTOLIC BLOOD PRESSURE: 79 MMHG

## 2025-04-29 DIAGNOSIS — Z01.20 ENCOUNTER FOR DENTAL EXAMINATION AND CLEANING WITHOUT ABNORMAL FINDINGS: Primary | ICD-10-CM

## 2025-04-29 PROCEDURE — D4910 PERIODONTAL MAINTENANCE: HCPCS

## 2025-04-29 PROCEDURE — D0274 BITEWINGS - 4 RADIOGRAPHIC IMAGES: HCPCS

## 2025-04-29 NOTE — PROGRESS NOTES
Procedure Details   - PERIODONTAL MAINTENANCE    OVERDUE PERIO MAINT, 4BWS     REVIEWED MED HX: meds, allergies, health changes reviewed in EPIC-no meds as per pt.   CHIEF CONCERN:  none   PAIN SCALE: 0  ASA CLASS:  ASA 2 - Patient with mild systemic disease with no functional limitations  PLAQUE:  heavy  CALCULUS:  Moderate  BLEEDING:  heavy  STAIN: Light  PERIO: spot probed-3-6mm    Hygiene Procedures: Scaled, Polished, Flossed    Oral Hygiene Instruction: Brushing minimum 2x daily for 2 minutes, daily flossing    Visual and Tactile Intraoral/ Extraoral evaluation: Oral and Oropharyngeal cancer evaluation. No findings     REFERRALS: None-pt stated he did see OS to ext #1,16 but DR said wasn't going to ext mandibular third molars    DR Mcdaniel evaluated bws taken today: no findings    Next Recall: 3-4 mos, perio maint 8/2025  Periodic exam is due 5/2025-informed pt due next month      Last /FMX :12/23   - BITEWINGS - 4 RADIOGRAPHIC IMAGES

## 2025-04-29 NOTE — DENTAL PROCEDURE DETAILS
OVERDUE BETITO BRAVO, 4BWS     REVIEWED MED HX: meds, allergies, health changes reviewed in EPIC-no meds as per pt.   CHIEF CONCERN:  none   PAIN SCALE: 0  ASA CLASS:  ASA 2 - Patient with mild systemic disease with no functional limitations  PLAQUE:  heavy  CALCULUS:  Moderate  BLEEDING:  heavy  STAIN: Light  PERIO: spot probed-3-6mm    Hygiene Procedures: Scaled, Polished, Flossed    Oral Hygiene Instruction: Brushing minimum 2x daily for 2 minutes, daily flossing    Visual and Tactile Intraoral/ Extraoral evaluation: Oral and Oropharyngeal cancer evaluation. No findings     REFERRALS: None-pt stated he did see OS to ext #1,16 but DR said wasn't going to ext mandibular third molars    DR Mcdaniel evaluated bws taken today: no findings    Next Recall: 3-4 mos, perio maint 8/2025  Periodic exam is due 5/2025-informed pt due next month      Last /FMX :12/23

## 2025-05-02 ENCOUNTER — APPOINTMENT (OUTPATIENT)
Dept: LAB | Facility: HOSPITAL | Age: 45
End: 2025-05-02
Payer: COMMERCIAL

## 2025-05-02 ENCOUNTER — APPOINTMENT (OUTPATIENT)
Dept: LAB | Facility: HOSPITAL | Age: 45
End: 2025-05-02
Attending: SURGERY
Payer: COMMERCIAL

## 2025-05-02 DIAGNOSIS — Z01.818 ENCOUNTER FOR PREADMISSION TESTING: ICD-10-CM

## 2025-05-02 LAB
ALBUMIN SERPL BCG-MCNC: 4.2 G/DL (ref 3.5–5)
ALP SERPL-CCNC: 133 U/L (ref 34–104)
ALT SERPL W P-5'-P-CCNC: 84 U/L (ref 7–52)
ANION GAP SERPL CALCULATED.3IONS-SCNC: 7 MMOL/L (ref 4–13)
AST SERPL W P-5'-P-CCNC: 39 U/L (ref 13–39)
BASOPHILS # BLD AUTO: 0.05 THOUSANDS/ÂΜL (ref 0–0.1)
BASOPHILS NFR BLD AUTO: 1 % (ref 0–1)
BILIRUB SERPL-MCNC: 0.42 MG/DL (ref 0.2–1)
BUN SERPL-MCNC: 12 MG/DL (ref 5–25)
CALCIUM SERPL-MCNC: 9.1 MG/DL (ref 8.4–10.2)
CHLORIDE SERPL-SCNC: 105 MMOL/L (ref 96–108)
CO2 SERPL-SCNC: 26 MMOL/L (ref 21–32)
CREAT SERPL-MCNC: 0.77 MG/DL (ref 0.6–1.3)
EOSINOPHIL # BLD AUTO: 0.27 THOUSAND/ÂΜL (ref 0–0.61)
EOSINOPHIL NFR BLD AUTO: 4 % (ref 0–6)
ERYTHROCYTE [DISTWIDTH] IN BLOOD BY AUTOMATED COUNT: 14.1 % (ref 11.6–15.1)
GFR SERPL CREATININE-BSD FRML MDRD: 109 ML/MIN/1.73SQ M
GLUCOSE SERPL-MCNC: 93 MG/DL (ref 65–140)
HCT VFR BLD AUTO: 40.1 % (ref 36.5–49.3)
HGB BLD-MCNC: 14 G/DL (ref 12–17)
IMM GRANULOCYTES # BLD AUTO: 0.07 THOUSAND/UL (ref 0–0.2)
IMM GRANULOCYTES NFR BLD AUTO: 1 % (ref 0–2)
LYMPHOCYTES # BLD AUTO: 2.38 THOUSANDS/ÂΜL (ref 0.6–4.47)
LYMPHOCYTES NFR BLD AUTO: 33 % (ref 14–44)
MCH RBC QN AUTO: 30.2 PG (ref 26.8–34.3)
MCHC RBC AUTO-ENTMCNC: 34.9 G/DL (ref 31.4–37.4)
MCV RBC AUTO: 87 FL (ref 82–98)
MONOCYTES # BLD AUTO: 0.77 THOUSAND/ÂΜL (ref 0.17–1.22)
MONOCYTES NFR BLD AUTO: 11 % (ref 4–12)
NEUTROPHILS # BLD AUTO: 3.74 THOUSANDS/ÂΜL (ref 1.85–7.62)
NEUTS SEG NFR BLD AUTO: 50 % (ref 43–75)
NRBC BLD AUTO-RTO: 0 /100 WBCS
PLATELET # BLD AUTO: 346 THOUSANDS/UL (ref 149–390)
PMV BLD AUTO: 8.9 FL (ref 8.9–12.7)
POTASSIUM SERPL-SCNC: 3.7 MMOL/L (ref 3.5–5.3)
PROT SERPL-MCNC: 7.1 G/DL (ref 6.4–8.4)
RBC # BLD AUTO: 4.63 MILLION/UL (ref 3.88–5.62)
SODIUM SERPL-SCNC: 138 MMOL/L (ref 135–147)
WBC # BLD AUTO: 7.28 THOUSAND/UL (ref 4.31–10.16)

## 2025-05-02 PROCEDURE — 80053 COMPREHEN METABOLIC PANEL: CPT

## 2025-05-02 PROCEDURE — 85025 COMPLETE CBC W/AUTO DIFF WBC: CPT

## 2025-05-02 PROCEDURE — 93005 ELECTROCARDIOGRAM TRACING: CPT

## 2025-05-02 PROCEDURE — 36415 COLL VENOUS BLD VENIPUNCTURE: CPT

## 2025-05-06 LAB
ATRIAL RATE: 79 BPM
P AXIS: 21 DEGREES
PR INTERVAL: 162 MS
QRS AXIS: -6 DEGREES
QRSD INTERVAL: 76 MS
QT INTERVAL: 378 MS
QTC INTERVAL: 434 MS
T WAVE AXIS: 6 DEGREES
VENTRICULAR RATE: 79 BPM

## 2025-05-06 PROCEDURE — 93010 ELECTROCARDIOGRAM REPORT: CPT | Performed by: INTERNAL MEDICINE

## 2025-05-07 NOTE — PRE-PROCEDURE INSTRUCTIONS
Pre-Surgery Instructions:   Medication Instructions    PARoxetine (PAXIL) 10 mg tablet Take day of surgery.    Medication instructions for day of surgery reviewed. Please take all instructed medications with only a sip of water.       You will receive a call one business day prior to surgery with an arrival time and hospital directions. If your surgery is scheduled on a Monday, the hospital will be calling you on the Friday prior to your surgery. If you have not heard from anyone by 8pm, please call the hospital supervisor through the hospital  at [phone]. ([place] [phone] or [place] [phone]).    Do not eat or drink anything after midnight the night before your surgery, including candy, mints, lifesavers, or chewing gum. Do not drink alcohol 24hrs before your surgery. Try not to smoke at least 24hrs before your surgery.       Follow the pre surgery showering instructions as listed in the “My Surgical Experience Booklet” or otherwise provided by your surgeon's office. Do not use a blade to shave the surgical area 1 week before surgery. It is okay to use a clean electric clippers up to 24 hours before surgery. Do not apply any lotions, creams, including makeup, cologne, deodorant, or perfumes after showering on the day of your surgery. Do not use dry shampoo, hair spray, hair gel, or any type of hair products.     No contact lenses, eye make-up, or artificial eyelashes. Remove nail polish, including gel polish, and any artificial, gel, or acrylic nails if possible. Remove all jewelry including rings and body piercing jewelry.     Wear causal clothing that is easy to take on and off. Consider your type of surgery.    Keep any valuables, jewelry, piercings at home. Please bring any specially ordered equipment (sling, braces) if indicated.    Arrange for a responsible person to drive you to and from the hospital on the day of your surgery. Please confirm the visitor policy for the day of your  procedure when you receive your phone call with an arrival time.     Call the surgeon's office with any new illnesses, exposures, or additional questions prior to surgery.    Please reference your “My Surgical Experience Booklet” for additional information to prepare for your upcoming surgery.    [place] [address] [place] [id number]    [place] SAME DAY SURGERY: [phone]

## 2025-05-08 ENCOUNTER — HOSPITAL ENCOUNTER (OUTPATIENT)
Facility: HOSPITAL | Age: 45
Setting detail: OUTPATIENT SURGERY
Discharge: HOME | End: 2025-05-08
Attending: SURGERY | Admitting: SURGERY
Payer: COMMERCIAL

## 2025-05-08 ENCOUNTER — ANESTHESIA (OUTPATIENT)
Dept: PERIOP | Facility: HOSPITAL | Age: 45
End: 2025-05-08
Payer: COMMERCIAL

## 2025-05-08 ENCOUNTER — ANESTHESIA EVENT (OUTPATIENT)
Dept: PERIOP | Facility: HOSPITAL | Age: 45
End: 2025-05-08
Payer: COMMERCIAL

## 2025-05-08 VITALS
SYSTOLIC BLOOD PRESSURE: 125 MMHG | TEMPERATURE: 98.2 F | DIASTOLIC BLOOD PRESSURE: 75 MMHG | WEIGHT: 162.8 LBS | BODY MASS INDEX: 27.12 KG/M2 | RESPIRATION RATE: 18 BRPM | OXYGEN SATURATION: 94 % | HEIGHT: 65 IN | HEART RATE: 73 BPM

## 2025-05-08 DIAGNOSIS — K42.9 UMBILICAL HERNIA WITHOUT OBSTRUCTION AND WITHOUT GANGRENE: Primary | ICD-10-CM

## 2025-05-08 PROCEDURE — 49592 RPR AA HRN 1ST < 3 NCR/STRN: CPT

## 2025-05-08 PROCEDURE — NC001 PR NO CHARGE: Performed by: PHYSICIAN ASSISTANT

## 2025-05-08 PROCEDURE — NC001 PR NO CHARGE

## 2025-05-08 PROCEDURE — 49592 RPR AA HRN 1ST < 3 NCR/STRN: CPT | Performed by: SURGERY

## 2025-05-08 RX ORDER — OXYCODONE AND ACETAMINOPHEN 5; 325 MG/1; MG/1
1 TABLET ORAL EVERY 4 HOURS PRN
Qty: 20 TABLET | Refills: 0 | Status: SHIPPED | OUTPATIENT
Start: 2025-05-08 | End: 2025-05-18

## 2025-05-08 RX ORDER — ONDANSETRON 2 MG/ML
4 INJECTION INTRAMUSCULAR; INTRAVENOUS ONCE AS NEEDED
Status: DISCONTINUED | OUTPATIENT
Start: 2025-05-08 | End: 2025-05-08 | Stop reason: HOSPADM

## 2025-05-08 RX ORDER — FENTANYL CITRATE 50 UG/ML
INJECTION, SOLUTION INTRAMUSCULAR; INTRAVENOUS AS NEEDED
Status: DISCONTINUED | OUTPATIENT
Start: 2025-05-08 | End: 2025-05-08

## 2025-05-08 RX ORDER — OXYCODONE HYDROCHLORIDE 5 MG/1
5 TABLET ORAL EVERY 4 HOURS PRN
Status: DISCONTINUED | OUTPATIENT
Start: 2025-05-08 | End: 2025-05-08 | Stop reason: HOSPADM

## 2025-05-08 RX ORDER — PROPOFOL 10 MG/ML
INJECTION, EMULSION INTRAVENOUS AS NEEDED
Status: DISCONTINUED | OUTPATIENT
Start: 2025-05-08 | End: 2025-05-08

## 2025-05-08 RX ORDER — ROCURONIUM BROMIDE 10 MG/ML
INJECTION, SOLUTION INTRAVENOUS AS NEEDED
Status: DISCONTINUED | OUTPATIENT
Start: 2025-05-08 | End: 2025-05-08

## 2025-05-08 RX ORDER — FENTANYL CITRATE/PF 50 MCG/ML
50 SYRINGE (ML) INJECTION
Status: DISCONTINUED | OUTPATIENT
Start: 2025-05-08 | End: 2025-05-08 | Stop reason: HOSPADM

## 2025-05-08 RX ORDER — OXYCODONE AND ACETAMINOPHEN 5; 325 MG/1; MG/1
1 TABLET ORAL EVERY 4 HOURS PRN
Refills: 0 | Status: DISCONTINUED | OUTPATIENT
Start: 2025-05-08 | End: 2025-05-08

## 2025-05-08 RX ORDER — SUCCINYLCHOLINE/SOD CL,ISO/PF 100 MG/5ML
SYRINGE (ML) INTRAVENOUS AS NEEDED
Status: DISCONTINUED | OUTPATIENT
Start: 2025-05-08 | End: 2025-05-08

## 2025-05-08 RX ORDER — SODIUM CHLORIDE, SODIUM LACTATE, POTASSIUM CHLORIDE, CALCIUM CHLORIDE 600; 310; 30; 20 MG/100ML; MG/100ML; MG/100ML; MG/100ML
20 INJECTION, SOLUTION INTRAVENOUS CONTINUOUS
Status: DISCONTINUED | OUTPATIENT
Start: 2025-05-08 | End: 2025-05-08 | Stop reason: HOSPADM

## 2025-05-08 RX ORDER — ONDANSETRON 2 MG/ML
INJECTION INTRAMUSCULAR; INTRAVENOUS AS NEEDED
Status: DISCONTINUED | OUTPATIENT
Start: 2025-05-08 | End: 2025-05-08

## 2025-05-08 RX ORDER — KETOROLAC TROMETHAMINE 30 MG/ML
30 INJECTION, SOLUTION INTRAMUSCULAR; INTRAVENOUS ONCE
Status: COMPLETED | OUTPATIENT
Start: 2025-05-08 | End: 2025-05-08

## 2025-05-08 RX ORDER — MAGNESIUM HYDROXIDE 1200 MG/15ML
LIQUID ORAL AS NEEDED
Status: DISCONTINUED | OUTPATIENT
Start: 2025-05-08 | End: 2025-05-08 | Stop reason: HOSPADM

## 2025-05-08 RX ORDER — DEXAMETHASONE SODIUM PHOSPHATE 10 MG/ML
INJECTION, SOLUTION INTRAMUSCULAR; INTRAVENOUS AS NEEDED
Status: DISCONTINUED | OUTPATIENT
Start: 2025-05-08 | End: 2025-05-08

## 2025-05-08 RX ORDER — CEFAZOLIN SODIUM 2 G/50ML
2000 SOLUTION INTRAVENOUS ONCE
Status: COMPLETED | OUTPATIENT
Start: 2025-05-08 | End: 2025-05-08

## 2025-05-08 RX ORDER — BUPIVACAINE HYDROCHLORIDE AND EPINEPHRINE 2.5; 5 MG/ML; UG/ML
INJECTION, SOLUTION EPIDURAL; INFILTRATION; INTRACAUDAL; PERINEURAL AS NEEDED
Status: DISCONTINUED | OUTPATIENT
Start: 2025-05-08 | End: 2025-05-08 | Stop reason: HOSPADM

## 2025-05-08 RX ORDER — LIDOCAINE HYDROCHLORIDE 10 MG/ML
INJECTION, SOLUTION EPIDURAL; INFILTRATION; INTRACAUDAL; PERINEURAL AS NEEDED
Status: DISCONTINUED | OUTPATIENT
Start: 2025-05-08 | End: 2025-05-08

## 2025-05-08 RX ADMIN — SUGAMMADEX 200 MG: 100 INJECTION, SOLUTION INTRAVENOUS at 14:04

## 2025-05-08 RX ADMIN — SODIUM CHLORIDE, SODIUM LACTATE, POTASSIUM CHLORIDE, AND CALCIUM CHLORIDE 20 ML/HR: .6; .31; .03; .02 INJECTION, SOLUTION INTRAVENOUS at 12:06

## 2025-05-08 RX ADMIN — DEXAMETHASONE SODIUM PHOSPHATE 10 MG: 10 INJECTION, SOLUTION INTRAMUSCULAR; INTRAVENOUS at 13:28

## 2025-05-08 RX ADMIN — SODIUM CHLORIDE, SODIUM LACTATE, POTASSIUM CHLORIDE, AND CALCIUM CHLORIDE: .6; .31; .03; .02 INJECTION, SOLUTION INTRAVENOUS at 14:00

## 2025-05-08 RX ADMIN — PROPOFOL 150 MG: 10 INJECTION, EMULSION INTRAVENOUS at 13:21

## 2025-05-08 RX ADMIN — FENTANYL CITRATE 50 MCG: 50 INJECTION, SOLUTION INTRAMUSCULAR; INTRAVENOUS at 13:21

## 2025-05-08 RX ADMIN — KETOROLAC TROMETHAMINE 30 MG: 30 INJECTION, SOLUTION INTRAMUSCULAR at 15:19

## 2025-05-08 RX ADMIN — CEFAZOLIN SODIUM 2000 MG: 2 SOLUTION INTRAVENOUS at 13:28

## 2025-05-08 RX ADMIN — ROCURONIUM BROMIDE 25 MG: 10 INJECTION, SOLUTION INTRAVENOUS at 13:28

## 2025-05-08 RX ADMIN — LIDOCAINE HYDROCHLORIDE 50 MG: 10 INJECTION, SOLUTION EPIDURAL; INFILTRATION; INTRACAUDAL; PERINEURAL at 13:21

## 2025-05-08 RX ADMIN — Medication 100 MG: at 13:22

## 2025-05-08 RX ADMIN — FENTANYL CITRATE 50 MCG: 50 INJECTION INTRAMUSCULAR; INTRAVENOUS at 14:49

## 2025-05-08 RX ADMIN — ROCURONIUM BROMIDE 5 MG: 10 INJECTION, SOLUTION INTRAVENOUS at 13:22

## 2025-05-08 RX ADMIN — FENTANYL CITRATE 50 MCG: 50 INJECTION, SOLUTION INTRAMUSCULAR; INTRAVENOUS at 13:27

## 2025-05-08 RX ADMIN — OXYCODONE 5 MG: 5 TABLET ORAL at 15:38

## 2025-05-08 RX ADMIN — ONDANSETRON 4 MG: 2 INJECTION INTRAMUSCULAR; INTRAVENOUS at 13:28

## 2025-05-08 NOTE — DISCHARGE INSTRUCTIONS
IV Toradol (Medication similar to Ibuprofen) was administered at 3:19 pm; recommend to wait 6 hours before taking any Ibuprofen or other NSAIDs

## 2025-05-08 NOTE — ANESTHESIA PREPROCEDURE EVALUATION
Procedure:  REPAIR HERNIA UMBILICAL (Abdomen)    Relevant Problems   CARDIO   (+) Chest pain   (+) Erectile dysfunction due to arterial insufficiency   (+) Mixed hyperlipidemia   (+) Short of breath on exertion      MUSCULOSKELETAL   (+) Chronic bilateral low back pain without sciatica      NEURO/PSYCH   (+) Chronic bilateral low back pain without sciatica   (+) Chronic male pelvic pain      PULMONARY   (+) Short of breath on exertion      Ear/Nose/Throat   (+) BPPV (benign paroxysmal positional vertigo)        Physical Exam    Airway    Mallampati score: II  TM Distance: >3 FB  Neck ROM: full     Dental   No notable dental hx     Cardiovascular      Pulmonary      Other Findings        Anesthesia Plan  ASA Score- 2     Anesthesia Type- general with ASA Monitors.         Additional Monitors:     Airway Plan: LMA.           Plan Factors-Exercise tolerance (METS): >4 METS.    Chart reviewed.    Patient summary reviewed.    Patient is not a current smoker.  Patient did not smoke on day of surgery.            Induction- intravenous.    Postoperative Plan- Plan for postoperative opioid use.     Perioperative Resuscitation Plan - Level 1 - Full Code.       Informed Consent- Anesthetic plan and risks discussed with patient.  I personally reviewed this patient with the CRNA. Discussed and agreed on the Anesthesia Plan with the CRNA..      NPO Status:  No vitals data found for the desired time range.

## 2025-05-08 NOTE — OP NOTE
OPERATIVE REPORT  PATIENT NAME: Don Koehler    :  1980  MRN: [id number]  Pt Location: [facility] OR ROOM 02    SURGERY DATE: 2025    Surgeons and Role:     * Boyd Carbajal MD - Primary     * Danita Granda PA-C - Assisting    Preop Diagnosis:  Umbilical hernia without obstruction and without gangrene [K42.9]    Post-Op Diagnosis Codes:     * Umbilical hernia without obstruction and without gangrene [K42.9]    Procedure(s):  REPAIR HERNIA UMBILICAL    Specimen(s):  * No specimens in log *    Estimated Blood Loss:   Minimal    Drains:  * No LDAs found *    Anesthesia Type:   General    Operative Indications:  Umbilical hernia without obstruction and without gangrene [K42.9]      Operative Findings:  1.5 cm x 1.5 cm umbilical hernia defect.  Some incarceration of preperitoneal fat.  Prior to opening the fascia, or reducing the contents of the hernia, the hernia defect was measured. The defect measured 1.5 cm by 1.5 cm. This was repaired as described in the body of the report below.      Complications:   None    Procedure and Technique:  Patient was brought to the operating room.  General anesthesia given by anesthesia team.  Parts prepped and draped in standard fashion.  Timeout performed.  Patient received preoperative IV antibiotics.  A curvilinear incision was made infraumbilical after giving local anesthesia into the skin.  It was deepened to the fascia.  The umbilical skin was dissected away from the hernia sac.  The contents of the hernia which was preperitoneal fat were excised and sent for pathology.  The defect was closed in interrupted figure-of-eight fashion using 0 Ethibond.  Hemostasis was adequate.  The umbilical stalk was then sutured to the fascia using 3-0 Vicryl interrupted.  Subcutaneous tissue was closed using 3-0 Vicryl interrupted.  The skin was closed using 4-0 Monocryl subcuticular.  Exofin was applied.  Patient was recovered from anesthesia and taken to PACU under stable  condition.   I was present for the entire procedure., A qualified resident physician was not available., and A physician assistant was required during the procedure for retraction, tissue handling, dissection and suturing.    Patient Disposition:  PACU              SIGNATURE: Boyd Carbajal MD  DATE: May 8, 2025  TIME: 2:22 PM

## 2025-05-08 NOTE — ANESTHESIA POSTPROCEDURE EVALUATION
Post-Op Assessment Note    Last Filed PACU Vitals:  Vitals Value Taken Time   Temp 96.7 °F (35.9 °C) 05/08/25 1417   Pulse 62 05/08/25 1458   /78 05/08/25 1449   Resp 18 05/08/25 1447   SpO2 95 % 05/08/25 1458   Vitals shown include unfiled device data.    Modified Nataliia:     Vitals Value Taken Time   Activity 2 05/08/25 1447   Respiration 2 05/08/25 1447   Circulation 2 05/08/25 1447   Consciousness 2 05/08/25 1447   Oxygen Saturation 2 05/08/25 1447     Modified Nataliia Score: 10

## 2025-05-08 NOTE — H&P
H&P - Surgery-General   Name: Don Koehler 44 y.o. male I MRN: [id number]  Unit/Bed#: OR POOL I Date of Admission: 5/8/2025   Date of Service: 5/8/2025 I Hospital Day: 0     Assessment & Plan  Umbilical hernia without obstruction and without gangrene  Soft, omental containing, CT scan does not show any bowel, nontender, passing gas and having bowel movements, pain overlying the right side of the mons pubis    -Plan for umbilical hernia repair today  - Consent to be obtained by surgeon  - Reviewed CT scan and MRI  - Reviewed labs  - Patient education  - History of taking  - Physical exam    History of Present Illness   Don Koehler is a 44 y.o. male without any significant past medical history presenting to the acute care surgery due to umbilical hernia repair that is fat-containing.  Patient has scheduled repair for today 5/8/2025.  Patient denies any new acute events since last seen.  Patient denies any chest pain, shortness of breath, palpitation, racing heart.  Patient denies any acute illness.  Patient denies any fevers or chills.  Patient is passing gas and having bowel movements routinely.  Patient is urinating without any issues.  Patient is not bothered by his umbilical hernia and denies any significant pain.  Patient does occasionally with increased intra-abdominal pressure does have a slight discomfort overlying his bellybutton but not currently.  Patient can walk 4-5 blocks without any issues.  Patient denies any other acute issues at this moment in time but does express the fact that he is concerned of operative components on the wrong side based on history of family with hernias that have had operations on the left side.  Patient also expresses that were operating on his umbilicus but that he is confused due to the fact that his pain is on the mons pubis region.  Patient was educated on the fact that repair of his umbilical hernia is not likely to improve all of his pain overlying the  right side of the mons pubis.  MRI was reviewed and CT reviewed and patient was educated on the fact that although he has an umbilical hernia he does not have any evidence or anatomical variant to suggest right sided mons pubis discomfort.  Patient understands and would like to proceed with umbilical hernia repair.  Patient was explained that the umbilical hernia is midline so there cannot be necessarily a wrong side and patient was shown by pointing of fingers exactly where the incision would take place.    Review of Systems   Constitutional: Negative.    HENT: Negative.     Eyes: Negative.    Respiratory: Negative.     Cardiovascular: Negative.    Gastrointestinal:  Positive for abdominal pain (right sided mon pubis pain).   Genitourinary: Negative.    Musculoskeletal: Negative.    Neurological: Negative.    Psychiatric/Behavioral: Negative.       Medical History Review: I have reviewed the patient's PMH, PSH, Social History, Family History, Meds, and Allergies   Historical Information   Past Medical History:   Diagnosis Date    Back pain     SOB (shortness of breath)      Past Surgical History:   Procedure Laterality Date    VT CIRCUMCISION AGE >28 DAYS N/A 2/16/2023    Procedure: CIRCUMCISION;  Surgeon: Gil Luke MD;  Location: [facility] MAIN OR;  Service: Urology    VT EXC VARICOCELE/LIGATION SPERMATIC VEINS SPX Left 2/16/2023    Procedure: VARICOCELECTOMY;  Surgeon: Gil Luke MD;  Location: [facility] MAIN OR;  Service: Urology     Social History     Tobacco Use    Smoking status: Never    Smokeless tobacco: Never   Vaping Use    Vaping status: Never Used   Substance and Sexual Activity    Alcohol use: No    Drug use: No    Sexual activity: Not on file     E-Cigarette/Vaping    E-Cigarette Use Never User      E-Cigarette/Vaping Substances    Nicotine No     THC No     CBD No     Flavoring No     Other No     Unknown No      Family history non-contributory  Social History     Tobacco Use    Smoking  status: Never    Smokeless tobacco: Never   Vaping Use    Vaping status: Never Used   Substance and Sexual Activity    Alcohol use: No    Drug use: No    Sexual activity: Not on file       Current Facility-Administered Medications:     ceFAZolin (ANCEF) IVPB (premix in dextrose) 2,000 mg 50 mL, Once    lactated ringers infusion, Continuous, Last Rate: 20 mL/hr (25 1206)  Prior to Admission Medications   Prescriptions Last Dose Informant Patient Reported? Taking?   PARoxetine (PAXIL) 10 mg tablet Past Week  No Yes   Sig: TAKE 4 TABLETS BY MOUTH DAILY.   benzonatate (TESSALON PERLES) 100 mg capsule   No No   Sig: Take 1 capsule (100 mg total) by mouth 3 (three) times a day as needed for cough   Patient not taking: Reported on 2025   fluticasone (FLONASE) 50 mcg/act nasal spray   No No   Si spray into each nostril daily   Patient not taking: Reported on 2025   methylPREDNISolone 4 MG tablet therapy pack   No No   Sig: Use as directed on package   Patient not taking: Reported on 2025   oxiconazole (OXISTAT) 1 % CREA   No No   Sig: Apply topically 2 (two) times a day for 21 days Apply to toenails twice daily for 21 days.   tadalafil (CIALIS) 5 MG tablet   No No   Sig: Take 1 tablet (5 mg total) by mouth daily   Patient not taking: Reported on 2025      Facility-Administered Medications: None     Patient has no known allergies.    Objective :  Temp:  [97.2 °F (36.2 °C)] 97.2 °F (36.2 °C)  HR:  [80] 80  BP: (137)/(70) 137/70  Resp:  [16] 16  SpO2:  [100 %] 100 %  O2 Device: None (Room air)      Physical Exam  Constitutional:       General: He is awake. He is not in acute distress.     Appearance: Normal appearance. He is normal weight. He is not ill-appearing, toxic-appearing or diaphoretic.      Interventions: He is not intubated.  HENT:      Head: Normocephalic and atraumatic.      Right Ear: Hearing and external ear normal.      Left Ear: Hearing and external ear normal.      Nose: Nose  "normal.   Cardiovascular:      Rate and Rhythm: Normal rate and regular rhythm.      Heart sounds: Normal heart sounds, S1 normal and S2 normal.   Pulmonary:      Effort: Pulmonary effort is normal. No tachypnea, bradypnea, accessory muscle usage, prolonged expiration, respiratory distress or retractions. He is not intubated.      Breath sounds: Normal breath sounds. No stridor, decreased air movement or transmitted upper airway sounds. No decreased breath sounds, wheezing, rhonchi or rales.   Abdominal:      General: Abdomen is flat. Bowel sounds are normal.      Palpations: Abdomen is soft.      Tenderness: There is no abdominal tenderness. There is no guarding.      Hernia: A hernia is present. Hernia is present in the umbilical area. There is no hernia in the left inguinal area or right inguinal area.   Skin:     General: Skin is warm and dry.      Capillary Refill: Capillary refill takes less than 2 seconds.   Psychiatric:         Behavior: Behavior is cooperative.         Lab Results: I have reviewed the following results:  No results for input(s): \"WBC\", \"HGB\", \"HCT\", \"PLT\", \"BANDSPCT\", \"SODIUM\", \"K\", \"CL\", \"CO2\", \"BUN\", \"CREATININE\", \"GLUC\", \"CAIONIZED\", \"MG\", \"PHOS\", \"AST\", \"ALT\", \"ALB\", \"TBILI\", \"DBILI\", \"ALKPHOS\", \"PTT\", \"INR\", \"HSTNI0\", \"HSTNI2\", \"BNP\", \"LACTICACID\" in the last 72 hours.    Imaging Results Review: I reviewed radiology reports from this admission including: CT abdomen/pelvis.  Other Study Results Review: No additional pertinent studies reviewed.      VTE Mechanical Prophylaxis: sequential compression device    Administrative Statements   I have spent a total time of 45 minutes in caring for this patient on the day of the visit/encounter including Diagnostic results, Prognosis, Risks and benefits of tx options, Instructions for management, Patient and family education, Importance of tx compliance, Risk factor reductions, Impressions, Counseling / Coordination of care, Documenting in the " medical record, Reviewing/placing orders in the medical record (including tests, medications, and/or procedures), Obtaining or reviewing history  , and Communicating with other healthcare professionals .

## 2025-05-08 NOTE — ASSESSMENT & PLAN NOTE
Soft, omental containing, CT scan does not show any bowel, nontender, passing gas and having bowel movements, pain overlying the right side of the mons pubis    -Plan for umbilical hernia repair today  - Consent to be obtained by surgeon  - Reviewed CT scan and MRI  - Reviewed labs  - Patient education  - History of taking  - Physical exam

## 2025-05-08 NOTE — ANESTHESIA POSTPROCEDURE EVALUATION
Post-Op Assessment Note    CV Status:  Stable    Pain management: adequate       Mental Status:  Sleepy   Hydration Status:  Euvolemic   PONV Controlled:  Controlled   Airway Patency:  Patent     Post Op Vitals Reviewed: Yes    No anethesia notable event occurred.    Staff: CRNA           Last Filed PACU Vitals:  Vitals Value Taken Time   Temp 96.7    Pulse 67 05/08/25 1417   /73    Resp 16    SpO2 99 % 05/08/25 1417   Vitals shown include unfiled device data.

## 2025-05-08 NOTE — DISCHARGE INSTR - AVS FIRST PAGE
Postoperative Care Instructions      1. General: You may feel pulling sensations around the wound or funny aches and pains around the incisions. This is normal. Even minor surgery is a change in your body and this is your body's reaction to it. If you have had abdominal surgery, it may help to support the incision with a small pillow or blanket for comfort when moving or coughing.    2. Wound care:  The glue over the incisions will fall off over the next week or two. If you have staples or stitches, they will be removed by the physician at your follow up appointment.    3. Showering: You may shower. Please do not soak wound in standing water such as a bath, hot tub, pool, lake, etc. Do not scrub or use exfoliants on the surgical wounds.    4. Activity: You may go up and down stairs, walk as much as you are comfortable, but walk at least 3 times each day. If you have had abdominal surgery, do not perform any strenuous exercise or lift anything heavier than 15 pounds for at least 4 weeks, unless cleared by your physician.    5. Diet: You may resume your regular diet. Please drink lots of water.    6. Medications: Resume all of your previous medications, unless told otherwise by the doctor.  A good option for pain control is to start with acetaminophen (Tylenol) 650mg and ibuprofen (Advil) 600mg and alternate taking them every 3 hours.  If this is not sufficient, you make take the narcotic pain medicine as prescribed. You do not need to take the narcotic pain medication unless you are having significant pain and discomfort. Please take the narcotic medication with food. Ensure that you do not take more than 4000 mg of Tylenol per day.     7. Driving: You will need someone to drive you home on the day of surgery. Do not drive or make any important decisions while on narcotic pain medication. Generally, you may drive 48 hours after you've stopped taking all narcotic pain medications. Generally, you may drive when you are  off all narcotic pain medications, and you can turn in your seat comfortably to check your blind spot and area able to slam on the brakes while driving if needed.     8. Upset Stomach: You may take Maalox, Tums, or similar items for an upset stomach. If your narcotic pain medication causes an upset stomach, do not take it on an empty stomach. Try taking it with at least some crackers or toast.     9. Constipation: Patients often experience constipation after surgery. We recommend starting an over-the-counter medication for this, such as Metamucil, Senokot, Colace, milk of magnesia, etc. You may stop taking these medications a couple days after your last dose of narcotic medication. If you experience significant nausea or vomiting after abdominal surgery, call the office before trying any of these medications.     10. Call the office: If you are experiencing any of the following: fevers above 101.5°, significant nausea or vomiting, if the wound develops drainage and/or excessive redness around the wound, or if you have significant diarrhea or other worsening symptoms.    11. Pain: A prescription for narcotic pain medication will be sent to your pharmacy upon discharge from the hospital.

## 2025-05-09 ENCOUNTER — OFFICE VISIT (OUTPATIENT)
Age: 45
End: 2025-05-09
Payer: COMMERCIAL

## 2025-05-09 VITALS
TEMPERATURE: 99.1 F | SYSTOLIC BLOOD PRESSURE: 120 MMHG | BODY MASS INDEX: 28.66 KG/M2 | HEIGHT: 65 IN | WEIGHT: 172 LBS | DIASTOLIC BLOOD PRESSURE: 72 MMHG | HEART RATE: 94 BPM | OXYGEN SATURATION: 96 %

## 2025-05-09 DIAGNOSIS — L29.9 PRURITUS: Primary | ICD-10-CM

## 2025-05-09 PROCEDURE — 99213 OFFICE O/P EST LOW 20 MIN: CPT | Performed by: PHYSICIAN ASSISTANT

## 2025-05-09 NOTE — PROGRESS NOTES
Name: Don Koehler      : 1980      MRN: [id number]  Encounter Provider: Luis Haas PA-C  Encounter Date: 2025   Encounter department: [facility]  :  Assessment & Plan  Pruritus  I will see him back in the office for a punch biopsy  Ambulatory referral to dermatology  He seems agreeable to the plan  Orders:    Ambulatory Referral to Dermatology; Future    Patient seen and examined with the assistance of online     History of Present Illness   Don Koehler is a 44 y.o. male who presents seen for multiple problems over several years with orchialgia and suprapubic pain.  He has had blood tests urine tests CAT scan and MRI all essentially negative.  Yesterday he had a umbilical hernia repair by general surgery for a smal umbilical hernia seen on CAT scan.  His testosterone and hormone panel are normal range.  His main complaint is itching around the corona of the penis.  He has used several creams and ointments prescribed by me and by his primary care without help.  There is no redness or discharge.  Through the  he states he is at his wits end.  He also says his girlfriend has itching as well and believes they may be passing it back and forth.  I offered several options including a small punch biopsy which I can do in the office and also send him to dermatology.  It looks like there was a dermatology consult which he had to change or cancel.    Review of Systems       Objective   There were no vitals taken for this visit.    Physical Exam GEN: no acute distress    RESP: breathing comfortably with no accessory muscle use    ABD: soft, non-tender, non-distended   INCISION: Umbilical hernia incision clean dry and intact  EXT: no significant peripheral edema   (Male): Penis circumcised, phallus normal, meatus patent.  Testicles descended into scrotum bilaterally without masses nor tenderness.  No inguinal hernias  "bilaterally.      RADIOLOGY:   IMPRESSION:     No acute findings in the abdomen or pelvis        Results   No results found for: \"PSA\"  Lab Results   Component Value Date    CALCIUM 9.1 05/02/2025    K 3.7 05/02/2025    CO2 26 05/02/2025     05/02/2025    BUN 12 05/02/2025    CREATININE 0.77 05/02/2025     Lab Results   Component Value Date    WBC 7.28 05/02/2025    HGB 14.0 05/02/2025    HCT 40.1 05/02/2025    MCV 87 05/02/2025     05/02/2025       Office Urine Dip  No results found for this or any previous visit (from the past hour).      "

## 2025-08-03 DIAGNOSIS — F52.4 PREMATURE EJACULATION: ICD-10-CM

## 2025-08-05 RX ORDER — PAROXETINE 10 MG/1
40 TABLET, FILM COATED ORAL DAILY
Qty: 360 TABLET | Refills: 0 | Status: SHIPPED | OUTPATIENT
Start: 2025-08-05

## 2025-08-19 ENCOUNTER — CONSULT (OUTPATIENT)
Age: 45
End: 2025-08-19

## 2025-08-19 DIAGNOSIS — L29.9 PRURITUS: ICD-10-CM

## 2025-08-19 RX ORDER — CLOBETASOL PROPIONATE 0.5 MG/G
OINTMENT TOPICAL 2 TIMES DAILY
Qty: 60 G | Refills: 2 | Status: SHIPPED | OUTPATIENT
Start: 2025-08-19 | End: 2025-09-02

## (undated) DEVICE — TUBING SUCTION 5MM X 12 FT

## (undated) DEVICE — EXOFIN PRECISION PEN HIGH VISCOSITY TOPICAL SKIN ADHESIVE: Brand: EXOFIN PRECISION PEN, 1G

## (undated) DEVICE — GLOVE SRG BIOGEL ECLIPSE 7.5

## (undated) DEVICE — INTENDED FOR TISSUE SEPARATION, AND OTHER PROCEDURES THAT REQUIRE A SHARP SURGICAL BLADE TO PUNCTURE OR CUT.: Brand: BARD-PARKER SAFETY BLADES SIZE 15, STERILE

## (undated) DEVICE — MEDI-VAC YANK SUCT HNDL W/TPRD BULBOUS TIP: Brand: CARDINAL HEALTH

## (undated) DEVICE — ANTIBACTERIAL UNDYED BRAIDED (POLYGLACTIN 910), SYNTHETIC ABSORBABLE SUTURE: Brand: COATED VICRYL

## (undated) DEVICE — NEPTUNE E-SEP SMOKE EVACUATION PENCIL, COATED, 70MM BLADE, PUSH BUTTON SWITCH: Brand: NEPTUNE E-SEP

## (undated) DEVICE — BETHLEHEM UNIVERSAL MINOR GEN: Brand: CARDINAL HEALTH

## (undated) DEVICE — GLOVE INDICATOR PI UNDERGLOVE SZ 8 BLUE

## (undated) DEVICE — PAD GROUNDING DUAL ADULT

## (undated) DEVICE — NEEDLE 25G X 1 1/2

## (undated) DEVICE — CHLORAPREP HI-LITE 26ML ORANGE

## (undated) DEVICE — SUT VICRYL 2-0 18 IN J911T

## (undated) DEVICE — SUT MONOCRYL 4-0 PS-2 27 IN Y426H

## (undated) DEVICE — SUT VICRYL 0 REEL 54 IN J287G